# Patient Record
Sex: MALE | Race: WHITE | NOT HISPANIC OR LATINO | Employment: OTHER | URBAN - METROPOLITAN AREA
[De-identification: names, ages, dates, MRNs, and addresses within clinical notes are randomized per-mention and may not be internally consistent; named-entity substitution may affect disease eponyms.]

---

## 2018-01-01 ENCOUNTER — OFFICE VISIT (OUTPATIENT)
Dept: MEDICAL GROUP | Facility: CLINIC | Age: 68
End: 2018-01-01
Payer: MEDICARE

## 2018-01-01 ENCOUNTER — APPOINTMENT (OUTPATIENT)
Dept: RADIOLOGY | Facility: MEDICAL CENTER | Age: 68
DRG: 871 | End: 2018-01-01
Attending: EMERGENCY MEDICINE
Payer: MEDICARE

## 2018-01-01 ENCOUNTER — APPOINTMENT (OUTPATIENT)
Dept: RADIOLOGY | Facility: MEDICAL CENTER | Age: 68
DRG: 871 | End: 2018-01-01
Attending: INTERNAL MEDICINE
Payer: MEDICARE

## 2018-01-01 ENCOUNTER — PATIENT OUTREACH (OUTPATIENT)
Dept: HEALTH INFORMATION MANAGEMENT | Facility: OTHER | Age: 68
End: 2018-01-01

## 2018-01-01 ENCOUNTER — HOSPITAL ENCOUNTER (INPATIENT)
Facility: MEDICAL CENTER | Age: 68
LOS: 1 days | DRG: 871 | End: 2018-06-21
Attending: EMERGENCY MEDICINE | Admitting: FAMILY MEDICINE
Payer: MEDICARE

## 2018-01-01 ENCOUNTER — HOSPITAL ENCOUNTER (INPATIENT)
Facility: MEDICAL CENTER | Age: 68
LOS: 10 days | DRG: 871 | End: 2018-06-13
Attending: EMERGENCY MEDICINE | Admitting: INTERNAL MEDICINE
Payer: MEDICARE

## 2018-01-01 ENCOUNTER — APPOINTMENT (OUTPATIENT)
Dept: RADIOLOGY | Facility: MEDICAL CENTER | Age: 68
DRG: 871 | End: 2018-01-01
Attending: FAMILY MEDICINE
Payer: MEDICARE

## 2018-01-01 VITALS
SYSTOLIC BLOOD PRESSURE: 108 MMHG | WEIGHT: 209.8 LBS | DIASTOLIC BLOOD PRESSURE: 60 MMHG | HEIGHT: 72 IN | HEART RATE: 88 BPM | BODY MASS INDEX: 28.42 KG/M2 | TEMPERATURE: 97.8 F | RESPIRATION RATE: 16 BRPM | OXYGEN SATURATION: 98 %

## 2018-01-01 VITALS
RESPIRATION RATE: 18 BRPM | HEIGHT: 72 IN | TEMPERATURE: 97.8 F | HEART RATE: 74 BPM | WEIGHT: 219.14 LBS | SYSTOLIC BLOOD PRESSURE: 128 MMHG | DIASTOLIC BLOOD PRESSURE: 81 MMHG | BODY MASS INDEX: 29.68 KG/M2 | OXYGEN SATURATION: 91 %

## 2018-01-01 VITALS
TEMPERATURE: 96.8 F | RESPIRATION RATE: 20 BRPM | DIASTOLIC BLOOD PRESSURE: 81 MMHG | OXYGEN SATURATION: 87 % | WEIGHT: 222.66 LBS | SYSTOLIC BLOOD PRESSURE: 130 MMHG | HEART RATE: 153 BPM | HEIGHT: 72 IN | BODY MASS INDEX: 30.16 KG/M2

## 2018-01-01 DIAGNOSIS — Z09 HOSPITAL DISCHARGE FOLLOW-UP: ICD-10-CM

## 2018-01-01 DIAGNOSIS — R17 JAUNDICE: ICD-10-CM

## 2018-01-01 DIAGNOSIS — A41.9 SEPSIS, DUE TO UNSPECIFIED ORGANISM: ICD-10-CM

## 2018-01-01 DIAGNOSIS — C79.9 METASTATIC CANCER (HCC): ICD-10-CM

## 2018-01-01 DIAGNOSIS — G89.3 CANCER ASSOCIATED PAIN: ICD-10-CM

## 2018-01-01 DIAGNOSIS — E80.6 HYPERBILIRUBINEMIA: ICD-10-CM

## 2018-01-01 DIAGNOSIS — N17.9 ACUTE RENAL FAILURE, UNSPECIFIED ACUTE RENAL FAILURE TYPE (HCC): ICD-10-CM

## 2018-01-01 DIAGNOSIS — J96.01 ACUTE RESPIRATORY FAILURE WITH HYPOXIA (HCC): ICD-10-CM

## 2018-01-01 DIAGNOSIS — R74.8 ELEVATED LIVER ENZYMES: ICD-10-CM

## 2018-01-01 DIAGNOSIS — C78.00 MALIGNANT NEOPLASM METASTATIC TO LUNG, UNSPECIFIED LATERALITY (HCC): ICD-10-CM

## 2018-01-01 DIAGNOSIS — R74.01 TRANSAMINITIS: ICD-10-CM

## 2018-01-01 LAB
ACTION RANGE TRIGGERED IACRT: YES
ACTION RANGE TRIGGERED IACRT: YES
ALBUMIN SERPL BCP-MCNC: 2.1 G/DL (ref 3.2–4.9)
ALBUMIN SERPL BCP-MCNC: 2.2 G/DL (ref 3.2–4.9)
ALBUMIN SERPL BCP-MCNC: 2.3 G/DL (ref 3.2–4.9)
ALBUMIN SERPL BCP-MCNC: 2.4 G/DL (ref 3.2–4.9)
ALBUMIN SERPL BCP-MCNC: 2.5 G/DL (ref 3.2–4.9)
ALBUMIN SERPL BCP-MCNC: 2.7 G/DL (ref 3.2–4.9)
ALBUMIN/GLOB SERPL: 0.5 G/DL
ALP SERPL-CCNC: 121 U/L (ref 30–99)
ALP SERPL-CCNC: 130 U/L (ref 30–99)
ALP SERPL-CCNC: 134 U/L (ref 30–99)
ALP SERPL-CCNC: 150 U/L (ref 30–99)
ALP SERPL-CCNC: 183 U/L (ref 30–99)
ALP SERPL-CCNC: 183 U/L (ref 30–99)
ALP SERPL-CCNC: 184 U/L (ref 30–99)
ALP SERPL-CCNC: 196 U/L (ref 30–99)
ALP SERPL-CCNC: 198 U/L (ref 30–99)
ALP SERPL-CCNC: 204 U/L (ref 30–99)
ALP SERPL-CCNC: 228 U/L (ref 30–99)
ALP SERPL-CCNC: 425 U/L (ref 30–99)
ALT SERPL-CCNC: 121 U/L (ref 2–50)
ALT SERPL-CCNC: 30 U/L (ref 2–50)
ALT SERPL-CCNC: 32 U/L (ref 2–50)
ALT SERPL-CCNC: 33 U/L (ref 2–50)
ALT SERPL-CCNC: 34 U/L (ref 2–50)
ALT SERPL-CCNC: 36 U/L (ref 2–50)
ALT SERPL-CCNC: 37 U/L (ref 2–50)
ALT SERPL-CCNC: 38 U/L (ref 2–50)
ALT SERPL-CCNC: 39 U/L (ref 2–50)
ALT SERPL-CCNC: 41 U/L (ref 2–50)
ALT SERPL-CCNC: 43 U/L (ref 2–50)
ALT SERPL-CCNC: 50 U/L (ref 2–50)
ANION GAP SERPL CALC-SCNC: 10 MMOL/L (ref 0–11.9)
ANION GAP SERPL CALC-SCNC: 24 MMOL/L (ref 0–11.9)
ANION GAP SERPL CALC-SCNC: 6 MMOL/L (ref 0–11.9)
ANION GAP SERPL CALC-SCNC: 8 MMOL/L (ref 0–11.9)
ANION GAP SERPL CALC-SCNC: 8 MMOL/L (ref 0–11.9)
ANION GAP SERPL CALC-SCNC: 9 MMOL/L (ref 0–11.9)
ANISOCYTOSIS BLD QL SMEAR: ABNORMAL
APPEARANCE UR: ABNORMAL
APTT PPP: 39.9 SEC (ref 24.7–36)
APTT PPP: 46.9 SEC (ref 24.7–36)
AST SERPL-CCNC: 543 U/L (ref 12–45)
AST SERPL-CCNC: 68 U/L (ref 12–45)
AST SERPL-CCNC: 73 U/L (ref 12–45)
AST SERPL-CCNC: 74 U/L (ref 12–45)
AST SERPL-CCNC: 74 U/L (ref 12–45)
AST SERPL-CCNC: 75 U/L (ref 12–45)
AST SERPL-CCNC: 79 U/L (ref 12–45)
AST SERPL-CCNC: 80 U/L (ref 12–45)
AST SERPL-CCNC: 82 U/L (ref 12–45)
AST SERPL-CCNC: 85 U/L (ref 12–45)
AST SERPL-CCNC: 87 U/L (ref 12–45)
AST SERPL-CCNC: 98 U/L (ref 12–45)
BACTERIA #/AREA URNS HPF: NEGATIVE /HPF
BACTERIA BLD CULT: NORMAL
BACTERIA BLD CULT: NORMAL
BASE EXCESS BLDA CALC-SCNC: -24 MMOL/L (ref -4–3)
BASE EXCESS BLDA CALC-SCNC: -25 MMOL/L (ref -4–3)
BASOPHILS # BLD AUTO: 0 % (ref 0–1.8)
BASOPHILS # BLD AUTO: 0 % (ref 0–1.8)
BASOPHILS # BLD AUTO: 0.2 % (ref 0–1.8)
BASOPHILS # BLD AUTO: 0.2 % (ref 0–1.8)
BASOPHILS # BLD AUTO: 0.3 % (ref 0–1.8)
BASOPHILS # BLD AUTO: 0.3 % (ref 0–1.8)
BASOPHILS # BLD AUTO: 0.4 % (ref 0–1.8)
BASOPHILS # BLD AUTO: 0.5 % (ref 0–1.8)
BASOPHILS # BLD AUTO: 1.8 % (ref 0–1.8)
BASOPHILS # BLD: 0 K/UL (ref 0–0.12)
BASOPHILS # BLD: 0 K/UL (ref 0–0.12)
BASOPHILS # BLD: 0.02 K/UL (ref 0–0.12)
BASOPHILS # BLD: 0.02 K/UL (ref 0–0.12)
BASOPHILS # BLD: 0.03 K/UL (ref 0–0.12)
BASOPHILS # BLD: 0.04 K/UL (ref 0–0.12)
BASOPHILS # BLD: 0.05 K/UL (ref 0–0.12)
BASOPHILS # BLD: 0.05 K/UL (ref 0–0.12)
BASOPHILS # BLD: 0.19 K/UL (ref 0–0.12)
BILIRUB SERPL-MCNC: 10.7 MG/DL (ref 0.1–1.5)
BILIRUB SERPL-MCNC: 11.5 MG/DL (ref 0.1–1.5)
BILIRUB SERPL-MCNC: 12.5 MG/DL (ref 0.1–1.5)
BILIRUB SERPL-MCNC: 12.6 MG/DL (ref 0.1–1.5)
BILIRUB SERPL-MCNC: 13.8 MG/DL (ref 0.1–1.5)
BILIRUB SERPL-MCNC: 15 MG/DL (ref 0.1–1.5)
BILIRUB SERPL-MCNC: 16 MG/DL (ref 0.1–1.5)
BILIRUB SERPL-MCNC: 16 MG/DL (ref 0.1–1.5)
BILIRUB SERPL-MCNC: 16.2 MG/DL (ref 0.1–1.5)
BILIRUB SERPL-MCNC: 17.3 MG/DL (ref 0.1–1.5)
BILIRUB SERPL-MCNC: 17.7 MG/DL (ref 0.1–1.5)
BILIRUB SERPL-MCNC: 17.7 MG/DL (ref 0.1–1.5)
BILIRUB UR QL STRIP.AUTO: ABNORMAL
BNP SERPL-MCNC: 212 PG/ML (ref 0–100)
BODY TEMPERATURE: ABNORMAL DEGREES
BODY TEMPERATURE: ABNORMAL DEGREES
BUN SERPL-MCNC: 11 MG/DL (ref 8–22)
BUN SERPL-MCNC: 12 MG/DL (ref 8–22)
BUN SERPL-MCNC: 12 MG/DL (ref 8–22)
BUN SERPL-MCNC: 13 MG/DL (ref 8–22)
BUN SERPL-MCNC: 14 MG/DL (ref 8–22)
BUN SERPL-MCNC: 15 MG/DL (ref 8–22)
BUN SERPL-MCNC: 17 MG/DL (ref 8–22)
BUN SERPL-MCNC: 33 MG/DL (ref 8–22)
CALCIUM SERPL-MCNC: 7.6 MG/DL (ref 8.5–10.5)
CALCIUM SERPL-MCNC: 7.8 MG/DL (ref 8.5–10.5)
CALCIUM SERPL-MCNC: 8.1 MG/DL (ref 8.5–10.5)
CALCIUM SERPL-MCNC: 8.2 MG/DL (ref 8.5–10.5)
CALCIUM SERPL-MCNC: 8.3 MG/DL (ref 8.5–10.5)
CALCIUM SERPL-MCNC: 8.5 MG/DL (ref 8.5–10.5)
CALCIUM SERPL-MCNC: 8.6 MG/DL (ref 8.5–10.5)
CALCIUM SERPL-MCNC: 8.7 MG/DL (ref 8.5–10.5)
CANCER AG19-9 SERPL-ACNC: ABNORMAL U/ML (ref 0–35)
CEA SERPL-MCNC: 227.2 NG/ML (ref 0–3)
CHLORIDE SERPL-SCNC: 100 MMOL/L (ref 96–112)
CHLORIDE SERPL-SCNC: 94 MMOL/L (ref 96–112)
CHLORIDE SERPL-SCNC: 96 MMOL/L (ref 96–112)
CHLORIDE SERPL-SCNC: 97 MMOL/L (ref 96–112)
CHLORIDE SERPL-SCNC: 98 MMOL/L (ref 96–112)
CHLORIDE SERPL-SCNC: 99 MMOL/L (ref 96–112)
CHLORIDE SERPL-SCNC: 99 MMOL/L (ref 96–112)
CO2 BLDA-SCNC: 12 MMOL/L (ref 20–33)
CO2 BLDA-SCNC: 12 MMOL/L (ref 20–33)
CO2 SERPL-SCNC: 12 MMOL/L (ref 20–33)
CO2 SERPL-SCNC: 21 MMOL/L (ref 20–33)
CO2 SERPL-SCNC: 23 MMOL/L (ref 20–33)
CO2 SERPL-SCNC: 24 MMOL/L (ref 20–33)
CO2 SERPL-SCNC: 25 MMOL/L (ref 20–33)
CO2 SERPL-SCNC: 25 MMOL/L (ref 20–33)
CO2 SERPL-SCNC: 26 MMOL/L (ref 20–33)
CO2 SERPL-SCNC: 26 MMOL/L (ref 20–33)
CO2 SERPL-SCNC: 27 MMOL/L (ref 20–33)
COLOR UR: ABNORMAL
COMMENT 1642: NORMAL
CREAT SERPL-MCNC: 0.52 MG/DL (ref 0.5–1.4)
CREAT SERPL-MCNC: 0.68 MG/DL (ref 0.5–1.4)
CREAT SERPL-MCNC: 0.73 MG/DL (ref 0.5–1.4)
CREAT SERPL-MCNC: 0.73 MG/DL (ref 0.5–1.4)
CREAT SERPL-MCNC: 0.75 MG/DL (ref 0.5–1.4)
CREAT SERPL-MCNC: 0.77 MG/DL (ref 0.5–1.4)
CREAT SERPL-MCNC: 0.78 MG/DL (ref 0.5–1.4)
CREAT SERPL-MCNC: 0.85 MG/DL (ref 0.5–1.4)
CREAT SERPL-MCNC: 0.87 MG/DL (ref 0.5–1.4)
CREAT SERPL-MCNC: 0.87 MG/DL (ref 0.5–1.4)
CREAT SERPL-MCNC: 0.96 MG/DL (ref 0.5–1.4)
CREAT SERPL-MCNC: 3.13 MG/DL (ref 0.5–1.4)
EKG IMPRESSION: NORMAL
EKG IMPRESSION: NORMAL
EOSINOPHIL # BLD AUTO: 0 K/UL (ref 0–0.51)
EOSINOPHIL # BLD AUTO: 0.04 K/UL (ref 0–0.51)
EOSINOPHIL # BLD AUTO: 0.04 K/UL (ref 0–0.51)
EOSINOPHIL # BLD AUTO: 0.05 K/UL (ref 0–0.51)
EOSINOPHIL # BLD AUTO: 0.06 K/UL (ref 0–0.51)
EOSINOPHIL # BLD AUTO: 0.09 K/UL (ref 0–0.51)
EOSINOPHIL # BLD AUTO: 0.09 K/UL (ref 0–0.51)
EOSINOPHIL # BLD AUTO: 0.11 K/UL (ref 0–0.51)
EOSINOPHIL # BLD AUTO: 0.12 K/UL (ref 0–0.51)
EOSINOPHIL # BLD AUTO: 0.16 K/UL (ref 0–0.51)
EOSINOPHIL # BLD AUTO: 0.18 K/UL (ref 0–0.51)
EOSINOPHIL NFR BLD: 0 % (ref 0–6.9)
EOSINOPHIL NFR BLD: 0.4 % (ref 0–6.9)
EOSINOPHIL NFR BLD: 0.6 % (ref 0–6.9)
EOSINOPHIL NFR BLD: 0.7 % (ref 0–6.9)
EOSINOPHIL NFR BLD: 0.8 % (ref 0–6.9)
EOSINOPHIL NFR BLD: 0.9 % (ref 0–6.9)
EOSINOPHIL NFR BLD: 1 % (ref 0–6.9)
EOSINOPHIL NFR BLD: 1.2 % (ref 0–6.9)
EOSINOPHIL NFR BLD: 1.7 % (ref 0–6.9)
EPI CELLS #/AREA URNS HPF: NEGATIVE /HPF
ERYTHROCYTE [DISTWIDTH] IN BLOOD BY AUTOMATED COUNT: 64.6 FL (ref 35.9–50)
ERYTHROCYTE [DISTWIDTH] IN BLOOD BY AUTOMATED COUNT: 68.5 FL (ref 35.9–50)
ERYTHROCYTE [DISTWIDTH] IN BLOOD BY AUTOMATED COUNT: 69.2 FL (ref 35.9–50)
ERYTHROCYTE [DISTWIDTH] IN BLOOD BY AUTOMATED COUNT: 70.9 FL (ref 35.9–50)
ERYTHROCYTE [DISTWIDTH] IN BLOOD BY AUTOMATED COUNT: 72.7 FL (ref 35.9–50)
ERYTHROCYTE [DISTWIDTH] IN BLOOD BY AUTOMATED COUNT: 72.8 FL (ref 35.9–50)
ERYTHROCYTE [DISTWIDTH] IN BLOOD BY AUTOMATED COUNT: 73 FL (ref 35.9–50)
ERYTHROCYTE [DISTWIDTH] IN BLOOD BY AUTOMATED COUNT: 73.2 FL (ref 35.9–50)
ERYTHROCYTE [DISTWIDTH] IN BLOOD BY AUTOMATED COUNT: 74 FL (ref 35.9–50)
ERYTHROCYTE [DISTWIDTH] IN BLOOD BY AUTOMATED COUNT: 74.1 FL (ref 35.9–50)
ERYTHROCYTE [DISTWIDTH] IN BLOOD BY AUTOMATED COUNT: 75.7 FL (ref 35.9–50)
GLOBULIN SER CALC-MCNC: 4.4 G/DL (ref 1.9–3.5)
GLOBULIN SER CALC-MCNC: 4.4 G/DL (ref 1.9–3.5)
GLOBULIN SER CALC-MCNC: 4.6 G/DL (ref 1.9–3.5)
GLOBULIN SER CALC-MCNC: 4.7 G/DL (ref 1.9–3.5)
GLOBULIN SER CALC-MCNC: 4.8 G/DL (ref 1.9–3.5)
GLOBULIN SER CALC-MCNC: 4.9 G/DL (ref 1.9–3.5)
GLOBULIN SER CALC-MCNC: 5 G/DL (ref 1.9–3.5)
GLOBULIN SER CALC-MCNC: 5.1 G/DL (ref 1.9–3.5)
GLOBULIN SER CALC-MCNC: 5.2 G/DL (ref 1.9–3.5)
GLOBULIN SER CALC-MCNC: 5.3 G/DL (ref 1.9–3.5)
GLUCOSE SERPL-MCNC: 103 MG/DL (ref 65–99)
GLUCOSE SERPL-MCNC: 107 MG/DL (ref 65–99)
GLUCOSE SERPL-MCNC: 116 MG/DL (ref 65–99)
GLUCOSE SERPL-MCNC: 122 MG/DL (ref 65–99)
GLUCOSE SERPL-MCNC: 124 MG/DL (ref 65–99)
GLUCOSE SERPL-MCNC: 130 MG/DL (ref 65–99)
GLUCOSE SERPL-MCNC: 156 MG/DL (ref 65–99)
GLUCOSE SERPL-MCNC: 57 MG/DL (ref 65–99)
GLUCOSE SERPL-MCNC: 92 MG/DL (ref 65–99)
GLUCOSE SERPL-MCNC: 94 MG/DL (ref 65–99)
GLUCOSE SERPL-MCNC: 95 MG/DL (ref 65–99)
GLUCOSE SERPL-MCNC: 97 MG/DL (ref 65–99)
GLUCOSE UR STRIP.AUTO-MCNC: NEGATIVE MG/DL
GRAN CASTS #/AREA URNS LPF: ABNORMAL /LPF
HCO3 BLDA-SCNC: 10.5 MMOL/L (ref 17–25)
HCO3 BLDA-SCNC: 9.7 MMOL/L (ref 17–25)
HCT VFR BLD AUTO: 28.1 % (ref 42–52)
HCT VFR BLD AUTO: 30 % (ref 42–52)
HCT VFR BLD AUTO: 30 % (ref 42–52)
HCT VFR BLD AUTO: 30.2 % (ref 42–52)
HCT VFR BLD AUTO: 30.2 % (ref 42–52)
HCT VFR BLD AUTO: 30.6 % (ref 42–52)
HCT VFR BLD AUTO: 31.7 % (ref 42–52)
HCT VFR BLD AUTO: 32.3 % (ref 42–52)
HCT VFR BLD AUTO: 33 % (ref 42–52)
HCT VFR BLD AUTO: 34.3 % (ref 42–52)
HCT VFR BLD AUTO: 36.6 % (ref 42–52)
HGB BLD-MCNC: 10 G/DL (ref 14–18)
HGB BLD-MCNC: 10.1 G/DL (ref 14–18)
HGB BLD-MCNC: 10.2 G/DL (ref 14–18)
HGB BLD-MCNC: 10.3 G/DL (ref 14–18)
HGB BLD-MCNC: 10.6 G/DL (ref 14–18)
HGB BLD-MCNC: 10.8 G/DL (ref 14–18)
HGB BLD-MCNC: 11.3 G/DL (ref 14–18)
HGB BLD-MCNC: 11.5 G/DL (ref 14–18)
HGB BLD-MCNC: 11.6 G/DL (ref 14–18)
HGB BLD-MCNC: 9.4 G/DL (ref 14–18)
HGB BLD-MCNC: 9.9 G/DL (ref 14–18)
HYALINE CASTS #/AREA URNS LPF: ABNORMAL /LPF
IMM GRANULOCYTES # BLD AUTO: 0.06 K/UL (ref 0–0.11)
IMM GRANULOCYTES # BLD AUTO: 0.07 K/UL (ref 0–0.11)
IMM GRANULOCYTES # BLD AUTO: 0.08 K/UL (ref 0–0.11)
IMM GRANULOCYTES # BLD AUTO: 0.1 K/UL (ref 0–0.11)
IMM GRANULOCYTES # BLD AUTO: 0.11 K/UL (ref 0–0.11)
IMM GRANULOCYTES # BLD AUTO: 0.11 K/UL (ref 0–0.11)
IMM GRANULOCYTES # BLD AUTO: 0.13 K/UL (ref 0–0.11)
IMM GRANULOCYTES # BLD AUTO: 0.13 K/UL (ref 0–0.11)
IMM GRANULOCYTES NFR BLD AUTO: 0.5 % (ref 0–0.9)
IMM GRANULOCYTES NFR BLD AUTO: 0.7 % (ref 0–0.9)
IMM GRANULOCYTES NFR BLD AUTO: 0.8 % (ref 0–0.9)
IMM GRANULOCYTES NFR BLD AUTO: 0.8 % (ref 0–0.9)
IMM GRANULOCYTES NFR BLD AUTO: 1 % (ref 0–0.9)
IMM GRANULOCYTES NFR BLD AUTO: 1.3 % (ref 0–0.9)
INR PPP: 1.27 (ref 0.87–1.13)
INR PPP: 1.31 (ref 0.87–1.13)
INR PPP: 1.81 (ref 0.87–1.13)
INST. QUALIFIED PATIENT IIQPT: YES
INST. QUALIFIED PATIENT IIQPT: YES
KETONES UR STRIP.AUTO-MCNC: NEGATIVE MG/DL
LACTATE BLD-SCNC: 16.2 MMOL/L (ref 0.5–2)
LACTATE BLD-SCNC: 2 MMOL/L (ref 0.5–2)
LACTATE BLD-SCNC: 2.5 MMOL/L (ref 0.5–2)
LEUKOCYTE ESTERASE UR QL STRIP.AUTO: ABNORMAL
LIPASE SERPL-CCNC: 35 U/L (ref 11–82)
LYMPHOCYTES # BLD AUTO: 0.69 K/UL (ref 1–4.8)
LYMPHOCYTES # BLD AUTO: 0.72 K/UL (ref 1–4.8)
LYMPHOCYTES # BLD AUTO: 0.73 K/UL (ref 1–4.8)
LYMPHOCYTES # BLD AUTO: 0.93 K/UL (ref 1–4.8)
LYMPHOCYTES # BLD AUTO: 0.95 K/UL (ref 1–4.8)
LYMPHOCYTES # BLD AUTO: 1.03 K/UL (ref 1–4.8)
LYMPHOCYTES # BLD AUTO: 1.12 K/UL (ref 1–4.8)
LYMPHOCYTES # BLD AUTO: 1.14 K/UL (ref 1–4.8)
LYMPHOCYTES # BLD AUTO: 1.15 K/UL (ref 1–4.8)
LYMPHOCYTES # BLD AUTO: 1.31 K/UL (ref 1–4.8)
LYMPHOCYTES # BLD AUTO: 1.6 K/UL (ref 1–4.8)
LYMPHOCYTES NFR BLD: 10.4 % (ref 22–41)
LYMPHOCYTES NFR BLD: 12.2 % (ref 22–41)
LYMPHOCYTES NFR BLD: 12.9 % (ref 22–41)
LYMPHOCYTES NFR BLD: 4.4 % (ref 22–41)
LYMPHOCYTES NFR BLD: 4.4 % (ref 22–41)
LYMPHOCYTES NFR BLD: 7 % (ref 22–41)
LYMPHOCYTES NFR BLD: 8.5 % (ref 22–41)
LYMPHOCYTES NFR BLD: 8.7 % (ref 22–41)
LYMPHOCYTES NFR BLD: 8.8 % (ref 22–41)
LYMPHOCYTES NFR BLD: 9 % (ref 22–41)
LYMPHOCYTES NFR BLD: 9.9 % (ref 22–41)
MACROCYTES BLD QL SMEAR: ABNORMAL
MANUAL DIFF BLD: ABNORMAL
MANUAL DIFF BLD: NORMAL
MANUAL DIFF BLD: NORMAL
MCH RBC QN AUTO: 31.1 PG (ref 27–33)
MCH RBC QN AUTO: 31.6 PG (ref 27–33)
MCH RBC QN AUTO: 31.6 PG (ref 27–33)
MCH RBC QN AUTO: 31.7 PG (ref 27–33)
MCH RBC QN AUTO: 31.9 PG (ref 27–33)
MCH RBC QN AUTO: 32 PG (ref 27–33)
MCH RBC QN AUTO: 32.1 PG (ref 27–33)
MCH RBC QN AUTO: 32.2 PG (ref 27–33)
MCH RBC QN AUTO: 32.7 PG (ref 27–33)
MCH RBC QN AUTO: 32.8 PG (ref 27–33)
MCH RBC QN AUTO: 32.8 PG (ref 27–33)
MCHC RBC AUTO-ENTMCNC: 31.7 G/DL (ref 33.7–35.3)
MCHC RBC AUTO-ENTMCNC: 32.8 G/DL (ref 33.7–35.3)
MCHC RBC AUTO-ENTMCNC: 33.1 G/DL (ref 33.7–35.3)
MCHC RBC AUTO-ENTMCNC: 33.4 G/DL (ref 33.7–35.3)
MCHC RBC AUTO-ENTMCNC: 33.4 G/DL (ref 33.7–35.3)
MCHC RBC AUTO-ENTMCNC: 33.5 G/DL (ref 33.7–35.3)
MCHC RBC AUTO-ENTMCNC: 33.5 G/DL (ref 33.7–35.3)
MCHC RBC AUTO-ENTMCNC: 33.7 G/DL (ref 33.7–35.3)
MCHC RBC AUTO-ENTMCNC: 33.7 G/DL (ref 33.7–35.3)
MCHC RBC AUTO-ENTMCNC: 34 G/DL (ref 33.7–35.3)
MCHC RBC AUTO-ENTMCNC: 34.2 G/DL (ref 33.7–35.3)
MCV RBC AUTO: 103.4 FL (ref 81.4–97.8)
MCV RBC AUTO: 92.7 FL (ref 81.4–97.8)
MCV RBC AUTO: 93.5 FL (ref 81.4–97.8)
MCV RBC AUTO: 93.9 FL (ref 81.4–97.8)
MCV RBC AUTO: 94.6 FL (ref 81.4–97.8)
MCV RBC AUTO: 95.9 FL (ref 81.4–97.8)
MCV RBC AUTO: 96.2 FL (ref 81.4–97.8)
MCV RBC AUTO: 96.4 FL (ref 81.4–97.8)
MCV RBC AUTO: 96.5 FL (ref 81.4–97.8)
MCV RBC AUTO: 96.8 FL (ref 81.4–97.8)
MCV RBC AUTO: 97.4 FL (ref 81.4–97.8)
METAMYELOCYTES NFR BLD MANUAL: 1.7 %
MICRO URNS: ABNORMAL
MONOCYTES # BLD AUTO: 0 K/UL (ref 0–0.85)
MONOCYTES # BLD AUTO: 0.45 K/UL (ref 0–0.85)
MONOCYTES # BLD AUTO: 0.77 K/UL (ref 0–0.85)
MONOCYTES # BLD AUTO: 0.95 K/UL (ref 0–0.85)
MONOCYTES # BLD AUTO: 1.04 K/UL (ref 0–0.85)
MONOCYTES # BLD AUTO: 1.05 K/UL (ref 0–0.85)
MONOCYTES # BLD AUTO: 1.06 K/UL (ref 0–0.85)
MONOCYTES # BLD AUTO: 1.14 K/UL (ref 0–0.85)
MONOCYTES # BLD AUTO: 1.24 K/UL (ref 0–0.85)
MONOCYTES # BLD AUTO: 1.39 K/UL (ref 0–0.85)
MONOCYTES # BLD AUTO: 1.92 K/UL (ref 0–0.85)
MONOCYTES NFR BLD AUTO: 0 % (ref 0–13.4)
MONOCYTES NFR BLD AUTO: 10.1 % (ref 0–13.4)
MONOCYTES NFR BLD AUTO: 10.6 % (ref 0–13.4)
MONOCYTES NFR BLD AUTO: 11.2 % (ref 0–13.4)
MONOCYTES NFR BLD AUTO: 12.3 % (ref 0–13.4)
MONOCYTES NFR BLD AUTO: 4.4 % (ref 0–13.4)
MONOCYTES NFR BLD AUTO: 7.2 % (ref 0–13.4)
MONOCYTES NFR BLD AUTO: 7.2 % (ref 0–13.4)
MONOCYTES NFR BLD AUTO: 9.5 % (ref 0–13.4)
MONOCYTES NFR BLD AUTO: 9.6 % (ref 0–13.4)
MONOCYTES NFR BLD AUTO: 9.8 % (ref 0–13.4)
MORPHOLOGY BLD-IMP: NORMAL
MYELOCYTES NFR BLD MANUAL: 0.9 %
MYELOCYTES NFR BLD MANUAL: 2.6 %
NEUTROPHILS # BLD AUTO: 10.31 K/UL (ref 1.82–7.42)
NEUTROPHILS # BLD AUTO: 10.84 K/UL (ref 1.82–7.42)
NEUTROPHILS # BLD AUTO: 12.73 K/UL (ref 1.82–7.42)
NEUTROPHILS # BLD AUTO: 15.01 K/UL (ref 1.82–7.42)
NEUTROPHILS # BLD AUTO: 7.5 K/UL (ref 1.82–7.42)
NEUTROPHILS # BLD AUTO: 8.15 K/UL (ref 1.82–7.42)
NEUTROPHILS # BLD AUTO: 8.42 K/UL (ref 1.82–7.42)
NEUTROPHILS # BLD AUTO: 8.59 K/UL (ref 1.82–7.42)
NEUTROPHILS # BLD AUTO: 8.67 K/UL (ref 1.82–7.42)
NEUTROPHILS # BLD AUTO: 8.87 K/UL (ref 1.82–7.42)
NEUTROPHILS # BLD AUTO: 9.92 K/UL (ref 1.82–7.42)
NEUTROPHILS NFR BLD: 62.3 % (ref 44–72)
NEUTROPHILS NFR BLD: 73.5 % (ref 44–72)
NEUTROPHILS NFR BLD: 75.9 % (ref 44–72)
NEUTROPHILS NFR BLD: 77.6 % (ref 44–72)
NEUTROPHILS NFR BLD: 78.1 % (ref 44–72)
NEUTROPHILS NFR BLD: 79.2 % (ref 44–72)
NEUTROPHILS NFR BLD: 79.5 % (ref 44–72)
NEUTROPHILS NFR BLD: 81.6 % (ref 44–72)
NEUTROPHILS NFR BLD: 82.6 % (ref 44–72)
NEUTROPHILS NFR BLD: 82.6 % (ref 44–72)
NEUTROPHILS NFR BLD: 84.2 % (ref 44–72)
NEUTS BAND NFR BLD MANUAL: 28.1 % (ref 0–10)
NITRITE UR QL STRIP.AUTO: POSITIVE
NRBC # BLD AUTO: 0 K/UL
NRBC # BLD AUTO: 0.04 K/UL
NRBC BLD-RTO: 0 /100 WBC
NRBC BLD-RTO: 0.2 /100 WBC
O2/TOTAL GAS SETTING VFR VENT: 100 %
O2/TOTAL GAS SETTING VFR VENT: 50 %
PCO2 BLDA: 66.1 MMHG (ref 26–37)
PCO2 BLDA: 68.3 MMHG (ref 26–37)
PH BLDA: 6.76 [PH] (ref 7.4–7.5)
PH BLDA: 6.81 [PH] (ref 7.4–7.5)
PH UR STRIP.AUTO: 5.5 [PH]
PLATELET # BLD AUTO: 183 K/UL (ref 164–446)
PLATELET # BLD AUTO: 331 K/UL (ref 164–446)
PLATELET # BLD AUTO: 331 K/UL (ref 164–446)
PLATELET # BLD AUTO: 336 K/UL (ref 164–446)
PLATELET # BLD AUTO: 343 K/UL (ref 164–446)
PLATELET # BLD AUTO: 345 K/UL (ref 164–446)
PLATELET # BLD AUTO: 348 K/UL (ref 164–446)
PLATELET # BLD AUTO: 350 K/UL (ref 164–446)
PLATELET # BLD AUTO: 360 K/UL (ref 164–446)
PLATELET # BLD AUTO: 363 K/UL (ref 164–446)
PLATELET # BLD AUTO: 368 K/UL (ref 164–446)
PLATELET BLD QL SMEAR: NORMAL
PMV BLD AUTO: 10.4 FL (ref 9–12.9)
PMV BLD AUTO: 9 FL (ref 9–12.9)
PMV BLD AUTO: 9 FL (ref 9–12.9)
PMV BLD AUTO: 9.1 FL (ref 9–12.9)
PMV BLD AUTO: 9.2 FL (ref 9–12.9)
PMV BLD AUTO: 9.2 FL (ref 9–12.9)
PMV BLD AUTO: 9.3 FL (ref 9–12.9)
PMV BLD AUTO: 9.5 FL (ref 9–12.9)
PO2 BLDA: 106 MMHG (ref 64–87)
PO2 BLDA: 61 MMHG (ref 64–87)
POLYCHROMASIA BLD QL SMEAR: NORMAL
POTASSIUM SERPL-SCNC: 3.4 MMOL/L (ref 3.6–5.5)
POTASSIUM SERPL-SCNC: 3.4 MMOL/L (ref 3.6–5.5)
POTASSIUM SERPL-SCNC: 3.5 MMOL/L (ref 3.6–5.5)
POTASSIUM SERPL-SCNC: 3.6 MMOL/L (ref 3.6–5.5)
POTASSIUM SERPL-SCNC: 3.6 MMOL/L (ref 3.6–5.5)
POTASSIUM SERPL-SCNC: 3.7 MMOL/L (ref 3.6–5.5)
POTASSIUM SERPL-SCNC: 3.8 MMOL/L (ref 3.6–5.5)
POTASSIUM SERPL-SCNC: 4 MMOL/L (ref 3.6–5.5)
POTASSIUM SERPL-SCNC: 4.1 MMOL/L (ref 3.6–5.5)
POTASSIUM SERPL-SCNC: 4.2 MMOL/L (ref 3.6–5.5)
PROCALCITONIN SERPL-MCNC: 0.6 NG/ML
PROMYELOCYTES NFR BLD MANUAL: 0.9 %
PROMYELOCYTES NFR BLD MANUAL: 0.9 %
PROT SERPL-MCNC: 6.5 G/DL (ref 6–8.2)
PROT SERPL-MCNC: 6.6 G/DL (ref 6–8.2)
PROT SERPL-MCNC: 7 G/DL (ref 6–8.2)
PROT SERPL-MCNC: 7.1 G/DL (ref 6–8.2)
PROT SERPL-MCNC: 7.2 G/DL (ref 6–8.2)
PROT SERPL-MCNC: 7.3 G/DL (ref 6–8.2)
PROT SERPL-MCNC: 7.4 G/DL (ref 6–8.2)
PROT SERPL-MCNC: 7.5 G/DL (ref 6–8.2)
PROT SERPL-MCNC: 7.6 G/DL (ref 6–8.2)
PROT SERPL-MCNC: 8 G/DL (ref 6–8.2)
PROT UR QL STRIP: 30 MG/DL
PROTHROMBIN TIME: 15.6 SEC (ref 12–14.6)
PROTHROMBIN TIME: 16 SEC (ref 12–14.6)
PROTHROMBIN TIME: 20.7 SEC (ref 12–14.6)
RBC # BLD AUTO: 2.93 M/UL (ref 4.7–6.1)
RBC # BLD AUTO: 3.08 M/UL (ref 4.7–6.1)
RBC # BLD AUTO: 3.12 M/UL (ref 4.7–6.1)
RBC # BLD AUTO: 3.12 M/UL (ref 4.7–6.1)
RBC # BLD AUTO: 3.13 M/UL (ref 4.7–6.1)
RBC # BLD AUTO: 3.26 M/UL (ref 4.7–6.1)
RBC # BLD AUTO: 3.35 M/UL (ref 4.7–6.1)
RBC # BLD AUTO: 3.35 M/UL (ref 4.7–6.1)
RBC # BLD AUTO: 3.53 M/UL (ref 4.7–6.1)
RBC # BLD AUTO: 3.54 M/UL (ref 4.7–6.1)
RBC # BLD AUTO: 3.7 M/UL (ref 4.7–6.1)
RBC # URNS HPF: ABNORMAL /HPF
RBC BLD AUTO: PRESENT
RBC UR QL AUTO: NEGATIVE
SAO2 % BLDA: 61 % (ref 93–99)
SAO2 % BLDA: 89 % (ref 93–99)
SIGNIFICANT IND 70042: NORMAL
SIGNIFICANT IND 70042: NORMAL
SITE SITE: NORMAL
SITE SITE: NORMAL
SODIUM SERPL-SCNC: 127 MMOL/L (ref 135–145)
SODIUM SERPL-SCNC: 128 MMOL/L (ref 135–145)
SODIUM SERPL-SCNC: 128 MMOL/L (ref 135–145)
SODIUM SERPL-SCNC: 129 MMOL/L (ref 135–145)
SODIUM SERPL-SCNC: 131 MMOL/L (ref 135–145)
SODIUM SERPL-SCNC: 132 MMOL/L (ref 135–145)
SODIUM SERPL-SCNC: 133 MMOL/L (ref 135–145)
SOURCE SOURCE: NORMAL
SOURCE SOURCE: NORMAL
SP GR UR STRIP.AUTO: 1.02
SPECIMEN DRAWN FROM PATIENT: ABNORMAL
SPECIMEN DRAWN FROM PATIENT: ABNORMAL
TROPONIN I SERPL-MCNC: 0.01 NG/ML (ref 0–0.04)
TROPONIN I SERPL-MCNC: 0.18 NG/ML (ref 0–0.04)
TSH SERPL DL<=0.005 MIU/L-ACNC: 1 UIU/ML (ref 0.38–5.33)
UROBILINOGEN UR STRIP.AUTO-MCNC: 1 MG/DL
WBC # BLD AUTO: 10.2 K/UL (ref 4.8–10.8)
WBC # BLD AUTO: 10.3 K/UL (ref 4.8–10.8)
WBC # BLD AUTO: 10.4 K/UL (ref 4.8–10.8)
WBC # BLD AUTO: 10.6 K/UL (ref 4.8–10.8)
WBC # BLD AUTO: 10.7 K/UL (ref 4.8–10.8)
WBC # BLD AUTO: 11.1 K/UL (ref 4.8–10.8)
WBC # BLD AUTO: 13 K/UL (ref 4.8–10.8)
WBC # BLD AUTO: 13.1 K/UL (ref 4.8–10.8)
WBC # BLD AUTO: 13.1 K/UL (ref 4.8–10.8)
WBC # BLD AUTO: 15.6 K/UL (ref 4.8–10.8)
WBC # BLD AUTO: 16.6 K/UL (ref 4.8–10.8)
WBC #/AREA URNS HPF: ABNORMAL /HPF

## 2018-01-01 PROCEDURE — A9270 NON-COVERED ITEM OR SERVICE: HCPCS | Performed by: INTERNAL MEDICINE

## 2018-01-01 PROCEDURE — 96366 THER/PROPH/DIAG IV INF ADDON: CPT

## 2018-01-01 PROCEDURE — 85007 BL SMEAR W/DIFF WBC COUNT: CPT

## 2018-01-01 PROCEDURE — 700111 HCHG RX REV CODE 636 W/ 250 OVERRIDE (IP)

## 2018-01-01 PROCEDURE — 80053 COMPREHEN METABOLIC PANEL: CPT

## 2018-01-01 PROCEDURE — 303105 HCHG CATHETER EXTRA

## 2018-01-01 PROCEDURE — 160048 HCHG OR STATISTICAL LEVEL 1-5: Performed by: INTERNAL MEDICINE

## 2018-01-01 PROCEDURE — 76705 ECHO EXAM OF ABDOMEN: CPT

## 2018-01-01 PROCEDURE — 160002 HCHG RECOVERY MINUTES (STAT)

## 2018-01-01 PROCEDURE — 700101 HCHG RX REV CODE 250

## 2018-01-01 PROCEDURE — 99233 SBSQ HOSP IP/OBS HIGH 50: CPT | Performed by: INTERNAL MEDICINE

## 2018-01-01 PROCEDURE — 700111 HCHG RX REV CODE 636 W/ 250 OVERRIDE (IP): Performed by: INTERNAL MEDICINE

## 2018-01-01 PROCEDURE — 83605 ASSAY OF LACTIC ACID: CPT

## 2018-01-01 PROCEDURE — 99291 CRITICAL CARE FIRST HOUR: CPT

## 2018-01-01 PROCEDURE — 36415 COLL VENOUS BLD VENIPUNCTURE: CPT

## 2018-01-01 PROCEDURE — 94640 AIRWAY INHALATION TREATMENT: CPT

## 2018-01-01 PROCEDURE — 306637 HCHG MISC ORTHO ITEM RC 0274

## 2018-01-01 PROCEDURE — 700117 HCHG RX CONTRAST REV CODE 255: Performed by: RADIOLOGY

## 2018-01-01 PROCEDURE — 85730 THROMBOPLASTIN TIME PARTIAL: CPT

## 2018-01-01 PROCEDURE — 700105 HCHG RX REV CODE 258: Performed by: EMERGENCY MEDICINE

## 2018-01-01 PROCEDURE — 82378 CARCINOEMBRYONIC ANTIGEN: CPT

## 2018-01-01 PROCEDURE — 302214 INTUBATION BOX: Performed by: EMERGENCY MEDICINE

## 2018-01-01 PROCEDURE — 87077 CULTURE AEROBIC IDENTIFY: CPT

## 2018-01-01 PROCEDURE — 85025 COMPLETE CBC W/AUTO DIFF WBC: CPT

## 2018-01-01 PROCEDURE — 700102 HCHG RX REV CODE 250 W/ 637 OVERRIDE(OP): Performed by: INTERNAL MEDICINE

## 2018-01-01 PROCEDURE — 96365 THER/PROPH/DIAG IV INF INIT: CPT

## 2018-01-01 PROCEDURE — BF101ZZ FLUOROSCOPY OF BILE DUCTS USING LOW OSMOLAR CONTRAST: ICD-10-PCS | Performed by: RADIOLOGY

## 2018-01-01 PROCEDURE — 87040 BLOOD CULTURE FOR BACTERIA: CPT

## 2018-01-01 PROCEDURE — 700101 HCHG RX REV CODE 250: Performed by: EMERGENCY MEDICINE

## 2018-01-01 PROCEDURE — 71045 X-RAY EXAM CHEST 1 VIEW: CPT

## 2018-01-01 PROCEDURE — 85610 PROTHROMBIN TIME: CPT

## 2018-01-01 PROCEDURE — 770004 HCHG ROOM/CARE - ONCOLOGY PRIVATE *

## 2018-01-01 PROCEDURE — 71260 CT THORAX DX C+: CPT

## 2018-01-01 PROCEDURE — 99291 CRITICAL CARE FIRST HOUR: CPT | Mod: 25 | Performed by: INTERNAL MEDICINE

## 2018-01-01 PROCEDURE — 0F9930Z DRAINAGE OF COMMON BILE DUCT WITH DRAINAGE DEVICE, PERCUTANEOUS APPROACH: ICD-10-PCS | Performed by: RADIOLOGY

## 2018-01-01 PROCEDURE — 700117 HCHG RX CONTRAST REV CODE 255: Performed by: EMERGENCY MEDICINE

## 2018-01-01 PROCEDURE — 700105 HCHG RX REV CODE 258: Performed by: INTERNAL MEDICINE

## 2018-01-01 PROCEDURE — 160203 HCHG ENDO MINUTES - 1ST 30 MINS LEVEL 4: Performed by: INTERNAL MEDICINE

## 2018-01-01 PROCEDURE — 47533 PLMT BILIARY DRAINAGE CATH: CPT

## 2018-01-01 PROCEDURE — 99239 HOSP IP/OBS DSCHRG MGMT >30: CPT | Performed by: INTERNAL MEDICINE

## 2018-01-01 PROCEDURE — 0DBN8ZZ EXCISION OF SIGMOID COLON, VIA NATURAL OR ARTIFICIAL OPENING ENDOSCOPIC: ICD-10-PCS | Performed by: INTERNAL MEDICINE

## 2018-01-01 PROCEDURE — 36600 WITHDRAWAL OF ARTERIAL BLOOD: CPT

## 2018-01-01 PROCEDURE — 84145 PROCALCITONIN (PCT): CPT

## 2018-01-01 PROCEDURE — 302131 K PAD MOTOR: Performed by: INTERNAL MEDICINE

## 2018-01-01 PROCEDURE — 700111 HCHG RX REV CODE 636 W/ 250 OVERRIDE (IP): Performed by: EMERGENCY MEDICINE

## 2018-01-01 PROCEDURE — 99232 SBSQ HOSP IP/OBS MODERATE 35: CPT | Performed by: INTERNAL MEDICINE

## 2018-01-01 PROCEDURE — 92950 HEART/LUNG RESUSCITATION CPR: CPT | Performed by: INTERNAL MEDICINE

## 2018-01-01 PROCEDURE — 85027 COMPLETE CBC AUTOMATED: CPT

## 2018-01-01 PROCEDURE — 94002 VENT MGMT INPAT INIT DAY: CPT

## 2018-01-01 PROCEDURE — 160208 HCHG ENDO MINUTES - EA ADDL 1 MIN LEVEL 4: Performed by: INTERNAL MEDICINE

## 2018-01-01 PROCEDURE — 87040 BLOOD CULTURE FOR BACTERIA: CPT | Mod: 91

## 2018-01-01 PROCEDURE — 83690 ASSAY OF LIPASE: CPT

## 2018-01-01 PROCEDURE — 83880 ASSAY OF NATRIURETIC PEPTIDE: CPT

## 2018-01-01 PROCEDURE — 84484 ASSAY OF TROPONIN QUANT: CPT

## 2018-01-01 PROCEDURE — 160002 HCHG RECOVERY MINUTES (STAT): Performed by: INTERNAL MEDICINE

## 2018-01-01 PROCEDURE — 82803 BLOOD GASES ANY COMBINATION: CPT

## 2018-01-01 PROCEDURE — 84443 ASSAY THYROID STIM HORMONE: CPT

## 2018-01-01 PROCEDURE — 99153 MOD SED SAME PHYS/QHP EA: CPT

## 2018-01-01 PROCEDURE — 304538 HCHG NG TUBE

## 2018-01-01 PROCEDURE — 96375 TX/PRO/DX INJ NEW DRUG ADDON: CPT

## 2018-01-01 PROCEDURE — 0FB13ZX EXCISION OF RIGHT LOBE LIVER, PERCUTANEOUS APPROACH, DIAGNOSTIC: ICD-10-PCS | Performed by: RADIOLOGY

## 2018-01-01 PROCEDURE — 99153 MOD SED SAME PHYS/QHP EA: CPT | Performed by: INTERNAL MEDICINE

## 2018-01-01 PROCEDURE — 700101 HCHG RX REV CODE 250: Performed by: INTERNAL MEDICINE

## 2018-01-01 PROCEDURE — 94760 N-INVAS EAR/PLS OXIMETRY 1: CPT

## 2018-01-01 PROCEDURE — 99292 CRITICAL CARE ADDL 30 MIN: CPT

## 2018-01-01 PROCEDURE — 51702 INSERT TEMP BLADDER CATH: CPT

## 2018-01-01 PROCEDURE — 99285 EMERGENCY DEPT VISIT HI MDM: CPT

## 2018-01-01 PROCEDURE — 96367 TX/PROPH/DG ADDL SEQ IV INF: CPT

## 2018-01-01 PROCEDURE — 88342 IMHCHEM/IMCYTCHM 1ST ANTB: CPT

## 2018-01-01 PROCEDURE — 93005 ELECTROCARDIOGRAM TRACING: CPT | Performed by: EMERGENCY MEDICINE

## 2018-01-01 PROCEDURE — 5A1935Z RESPIRATORY VENTILATION, LESS THAN 24 CONSECUTIVE HOURS: ICD-10-PCS | Performed by: EMERGENCY MEDICINE

## 2018-01-01 PROCEDURE — 87186 SC STD MICRODIL/AGAR DIL: CPT

## 2018-01-01 PROCEDURE — 86301 IMMUNOASSAY TUMOR CA 19-9: CPT

## 2018-01-01 PROCEDURE — 302151 K-PAD 14X20: Performed by: INTERNAL MEDICINE

## 2018-01-01 PROCEDURE — 700111 HCHG RX REV CODE 636 W/ 250 OVERRIDE (IP): Performed by: RADIOLOGY

## 2018-01-01 PROCEDURE — 74176 CT ABD & PELVIS W/O CONTRAST: CPT

## 2018-01-01 PROCEDURE — 503081 HCHG INK, SPOT LF (ENDO): Performed by: INTERNAL MEDICINE

## 2018-01-01 PROCEDURE — 74150 CT ABDOMEN W/O CONTRAST: CPT

## 2018-01-01 PROCEDURE — 500066 HCHG BITE BLOCK, ECT: Performed by: INTERNAL MEDICINE

## 2018-01-01 PROCEDURE — 82962 GLUCOSE BLOOD TEST: CPT

## 2018-01-01 PROCEDURE — 96368 THER/DIAG CONCURRENT INF: CPT

## 2018-01-01 PROCEDURE — 88307 TISSUE EXAM BY PATHOLOGIST: CPT

## 2018-01-01 PROCEDURE — 0DJ08ZZ INSPECTION OF UPPER INTESTINAL TRACT, VIA NATURAL OR ARTIFICIAL OPENING ENDOSCOPIC: ICD-10-PCS | Performed by: INTERNAL MEDICINE

## 2018-01-01 PROCEDURE — 99291 CRITICAL CARE FIRST HOUR: CPT | Performed by: FAMILY MEDICINE

## 2018-01-01 PROCEDURE — 92950 HEART/LUNG RESUSCITATION CPR: CPT

## 2018-01-01 PROCEDURE — 5A12012 PERFORMANCE OF CARDIAC OUTPUT, SINGLE, MANUAL: ICD-10-PCS | Performed by: EMERGENCY MEDICINE

## 2018-01-01 PROCEDURE — 160035 HCHG PACU - 1ST 60 MINS PHASE I: Performed by: INTERNAL MEDICINE

## 2018-01-01 PROCEDURE — 99223 1ST HOSP IP/OBS HIGH 75: CPT | Performed by: INTERNAL MEDICINE

## 2018-01-01 PROCEDURE — 99495 TRANSJ CARE MGMT MOD F2F 14D: CPT | Performed by: FAMILY MEDICINE

## 2018-01-01 PROCEDURE — 81001 URINALYSIS AUTO W/SCOPE: CPT

## 2018-01-01 PROCEDURE — 99152 MOD SED SAME PHYS/QHP 5/>YRS: CPT | Performed by: INTERNAL MEDICINE

## 2018-01-01 PROCEDURE — 31500 INSERT EMERGENCY AIRWAY: CPT

## 2018-01-01 PROCEDURE — 88341 IMHCHEM/IMCYTCHM EA ADD ANTB: CPT | Mod: 91

## 2018-01-01 PROCEDURE — 88305 TISSUE EXAM BY PATHOLOGIST: CPT

## 2018-01-01 PROCEDURE — 0BH17EZ INSERTION OF ENDOTRACHEAL AIRWAY INTO TRACHEA, VIA NATURAL OR ARTIFICIAL OPENING: ICD-10-PCS | Performed by: EMERGENCY MEDICINE

## 2018-01-01 RX ORDER — DEXTROSE MONOHYDRATE 25 G/50ML
INJECTION, SOLUTION INTRAVENOUS
Status: COMPLETED | OUTPATIENT
Start: 2018-01-01 | End: 2018-01-01

## 2018-01-01 RX ORDER — SODIUM CHLORIDE 9 MG/ML
INJECTION, SOLUTION INTRAVENOUS
Status: COMPLETED | OUTPATIENT
Start: 2018-01-01 | End: 2018-01-01

## 2018-01-01 RX ORDER — DIPHENHYDRAMINE HCL 25 MG
25 TABLET ORAL EVERY 6 HOURS PRN
Status: DISCONTINUED | OUTPATIENT
Start: 2018-01-01 | End: 2018-01-01 | Stop reason: HOSPADM

## 2018-01-01 RX ORDER — SODIUM CHLORIDE 9 MG/ML
1000 INJECTION, SOLUTION INTRAVENOUS
Status: DISCONTINUED | OUTPATIENT
Start: 2018-01-01 | End: 2018-01-01 | Stop reason: HOSPADM

## 2018-01-01 RX ORDER — OXYCODONE HYDROCHLORIDE 5 MG/1
5 TABLET ORAL
Status: DISCONTINUED | OUTPATIENT
Start: 2018-01-01 | End: 2018-01-01 | Stop reason: HOSPADM

## 2018-01-01 RX ORDER — MIDAZOLAM HYDROCHLORIDE 1 MG/ML
INJECTION INTRAMUSCULAR; INTRAVENOUS
Status: COMPLETED
Start: 2018-01-01 | End: 2018-01-01

## 2018-01-01 RX ORDER — BISACODYL 10 MG
10 SUPPOSITORY, RECTAL RECTAL
Status: DISCONTINUED | OUTPATIENT
Start: 2018-01-01 | End: 2018-06-22 | Stop reason: HOSPADM

## 2018-01-01 RX ORDER — SODIUM CHLORIDE 9 MG/ML
500 INJECTION, SOLUTION INTRAVENOUS
Status: ACTIVE | OUTPATIENT
Start: 2018-01-01 | End: 2018-01-01

## 2018-01-01 RX ORDER — HYDROCODONE BITARTRATE AND ACETAMINOPHEN 5; 325 MG/1; MG/1
1-2 TABLET ORAL EVERY 6 HOURS PRN
Qty: 20 TAB | Refills: 0 | Status: SHIPPED | OUTPATIENT
Start: 2018-01-01 | End: 2018-01-01

## 2018-01-01 RX ORDER — NOREPINEPHRINE BITARTRATE 1 MG/ML
INJECTION, SOLUTION INTRAVENOUS
Status: DISCONTINUED
Start: 2018-01-01 | End: 2018-06-22 | Stop reason: HOSPADM

## 2018-01-01 RX ORDER — SODIUM CHLORIDE 9 MG/ML
500 INJECTION, SOLUTION INTRAVENOUS PRN
Status: DISCONTINUED | OUTPATIENT
Start: 2018-01-01 | End: 2018-01-01 | Stop reason: HOSPADM

## 2018-01-01 RX ORDER — DIPHENHYDRAMINE HCL 25 MG
25 TABLET ORAL EVERY 6 HOURS PRN
Qty: 30 TAB | Refills: 0 | Status: SHIPPED | OUTPATIENT
Start: 2018-01-01 | End: 2018-01-01

## 2018-01-01 RX ORDER — LIDOCAINE HYDROCHLORIDE 20 MG/ML
INJECTION, SOLUTION INFILTRATION; PERINEURAL
Status: COMPLETED
Start: 2018-01-01 | End: 2018-01-01

## 2018-01-01 RX ORDER — DEXTROSE MONOHYDRATE, SODIUM CHLORIDE, AND POTASSIUM CHLORIDE 50; 1.49; 9 G/1000ML; G/1000ML; G/1000ML
INJECTION, SOLUTION INTRAVENOUS CONTINUOUS
Status: DISCONTINUED | OUTPATIENT
Start: 2018-01-01 | End: 2018-06-22

## 2018-01-01 RX ORDER — MIDAZOLAM HYDROCHLORIDE 1 MG/ML
.5-2 INJECTION INTRAMUSCULAR; INTRAVENOUS PRN
Status: ACTIVE | OUTPATIENT
Start: 2018-01-01 | End: 2018-01-01

## 2018-01-01 RX ORDER — IPRATROPIUM BROMIDE AND ALBUTEROL SULFATE 2.5; .5 MG/3ML; MG/3ML
3 SOLUTION RESPIRATORY (INHALATION)
Status: COMPLETED | OUTPATIENT
Start: 2018-01-01 | End: 2018-01-01

## 2018-01-01 RX ORDER — DEXTROSE MONOHYDRATE 25 G/50ML
25 INJECTION, SOLUTION INTRAVENOUS
Status: DISCONTINUED | OUTPATIENT
Start: 2018-01-01 | End: 2018-06-22 | Stop reason: HOSPADM

## 2018-01-01 RX ORDER — ONDANSETRON 4 MG/1
4 TABLET, ORALLY DISINTEGRATING ORAL EVERY 4 HOURS PRN
Qty: 10 TAB | Refills: 0 | Status: SHIPPED | OUTPATIENT
Start: 2018-01-01 | End: 2018-01-01

## 2018-01-01 RX ORDER — ONDANSETRON 2 MG/ML
4 INJECTION INTRAMUSCULAR; INTRAVENOUS EVERY 4 HOURS PRN
Status: DISCONTINUED | OUTPATIENT
Start: 2018-01-01 | End: 2018-06-22

## 2018-01-01 RX ORDER — OXYCODONE HYDROCHLORIDE 5 MG/1
2.5 TABLET ORAL
Status: DISCONTINUED | OUTPATIENT
Start: 2018-01-01 | End: 2018-01-01 | Stop reason: HOSPADM

## 2018-01-01 RX ORDER — AMOXICILLIN 250 MG
2 CAPSULE ORAL 2 TIMES DAILY PRN
Status: DISCONTINUED | OUTPATIENT
Start: 2018-01-01 | End: 2018-01-01 | Stop reason: HOSPADM

## 2018-01-01 RX ORDER — SODIUM CHLORIDE 9 MG/ML
1000 INJECTION, SOLUTION INTRAVENOUS
Status: DISCONTINUED | OUTPATIENT
Start: 2018-01-01 | End: 2018-06-22

## 2018-01-01 RX ORDER — CYCLOBENZAPRINE HCL 10 MG
10 TABLET ORAL 3 TIMES DAILY PRN
Status: DISCONTINUED | OUTPATIENT
Start: 2018-01-01 | End: 2018-01-01 | Stop reason: HOSPADM

## 2018-01-01 RX ORDER — SODIUM CHLORIDE, SODIUM LACTATE, POTASSIUM CHLORIDE, CALCIUM CHLORIDE 600; 310; 30; 20 MG/100ML; MG/100ML; MG/100ML; MG/100ML
INJECTION, SOLUTION INTRAVENOUS
Status: COMPLETED | OUTPATIENT
Start: 2018-01-01 | End: 2018-01-01

## 2018-01-01 RX ORDER — SODIUM CHLORIDE 9 MG/ML
30 INJECTION, SOLUTION INTRAVENOUS
Status: DISCONTINUED | OUTPATIENT
Start: 2018-01-01 | End: 2018-01-01

## 2018-01-01 RX ORDER — DIPHENHYDRAMINE HCL 25 MG
25 TABLET ORAL EVERY 6 HOURS PRN
Qty: 30 TAB | Refills: 0 | Status: SHIPPED | OUTPATIENT
Start: 2018-01-01

## 2018-01-01 RX ORDER — BISACODYL 10 MG
10 SUPPOSITORY, RECTAL RECTAL
Status: DISCONTINUED | OUTPATIENT
Start: 2018-01-01 | End: 2018-01-01 | Stop reason: HOSPADM

## 2018-01-01 RX ORDER — MIDAZOLAM HYDROCHLORIDE 1 MG/ML
INJECTION INTRAMUSCULAR; INTRAVENOUS
Status: DISPENSED
Start: 2018-01-01 | End: 2018-01-01

## 2018-01-01 RX ORDER — ONDANSETRON 4 MG/1
4 TABLET, ORALLY DISINTEGRATING ORAL EVERY 4 HOURS PRN
Status: DISCONTINUED | OUTPATIENT
Start: 2018-01-01 | End: 2018-06-22

## 2018-01-01 RX ORDER — IBUPROFEN 200 MG
400 TABLET ORAL EVERY 6 HOURS PRN
COMMUNITY

## 2018-01-01 RX ORDER — SODIUM CHLORIDE 9 MG/ML
30 INJECTION, SOLUTION INTRAVENOUS
Status: DISCONTINUED | OUTPATIENT
Start: 2018-01-01 | End: 2018-01-01 | Stop reason: HOSPADM

## 2018-01-01 RX ORDER — CEFTRIAXONE 2 G/1
2 INJECTION, POWDER, FOR SOLUTION INTRAMUSCULAR; INTRAVENOUS ONCE
Status: COMPLETED | OUTPATIENT
Start: 2018-01-01 | End: 2018-01-01

## 2018-01-01 RX ORDER — ETOMIDATE 2 MG/ML
INJECTION INTRAVENOUS
Status: COMPLETED | OUTPATIENT
Start: 2018-01-01 | End: 2018-01-01

## 2018-01-01 RX ORDER — SUCCINYLCHOLINE CHLORIDE 20 MG/ML
INJECTION INTRAMUSCULAR; INTRAVENOUS
Status: COMPLETED | OUTPATIENT
Start: 2018-01-01 | End: 2018-01-01

## 2018-01-01 RX ORDER — ONDANSETRON 2 MG/ML
4 INJECTION INTRAMUSCULAR; INTRAVENOUS EVERY 4 HOURS PRN
Status: DISCONTINUED | OUTPATIENT
Start: 2018-01-01 | End: 2018-01-01 | Stop reason: HOSPADM

## 2018-01-01 RX ORDER — ACETAMINOPHEN 325 MG/1
650 TABLET ORAL EVERY 6 HOURS PRN
Status: DISCONTINUED | OUTPATIENT
Start: 2018-01-01 | End: 2018-01-01 | Stop reason: HOSPADM

## 2018-01-01 RX ORDER — SODIUM CHLORIDE 9 MG/ML
INJECTION, SOLUTION INTRAVENOUS CONTINUOUS
Status: DISCONTINUED | OUTPATIENT
Start: 2018-01-01 | End: 2018-01-01

## 2018-01-01 RX ORDER — DOPAMINE HYDROCHLORIDE 160 MG/100ML
INJECTION, SOLUTION INTRAVENOUS
Status: DISCONTINUED
Start: 2018-01-01 | End: 2018-06-22 | Stop reason: HOSPADM

## 2018-01-01 RX ORDER — AMOXICILLIN 250 MG
2 CAPSULE ORAL 2 TIMES DAILY
Status: DISCONTINUED | OUTPATIENT
Start: 2018-01-01 | End: 2018-06-22

## 2018-01-01 RX ORDER — POLYETHYLENE GLYCOL 3350 17 G/17G
1 POWDER, FOR SOLUTION ORAL
Status: DISCONTINUED | OUTPATIENT
Start: 2018-01-01 | End: 2018-06-22

## 2018-01-01 RX ORDER — ONDANSETRON 4 MG/1
4 TABLET, ORALLY DISINTEGRATING ORAL EVERY 4 HOURS PRN
Status: DISCONTINUED | OUTPATIENT
Start: 2018-01-01 | End: 2018-01-01 | Stop reason: HOSPADM

## 2018-01-01 RX ORDER — OMEPRAZOLE 20 MG/1
20 CAPSULE, DELAYED RELEASE ORAL 2 TIMES DAILY
Qty: 60 CAP | Refills: 0 | Status: SHIPPED | OUTPATIENT
Start: 2018-01-01 | End: 2018-01-01

## 2018-01-01 RX ORDER — CEFTRIAXONE 1 G/1
INJECTION, POWDER, FOR SOLUTION INTRAMUSCULAR; INTRAVENOUS
Status: COMPLETED
Start: 2018-01-01 | End: 2018-01-01

## 2018-01-01 RX ORDER — MORPHINE SULFATE 4 MG/ML
2 INJECTION, SOLUTION INTRAMUSCULAR; INTRAVENOUS
Status: DISCONTINUED | OUTPATIENT
Start: 2018-01-01 | End: 2018-01-01 | Stop reason: HOSPADM

## 2018-01-01 RX ORDER — LIDOCAINE HYDROCHLORIDE 40 MG/ML
SOLUTION TOPICAL
Status: DISPENSED
Start: 2018-01-01 | End: 2018-01-01

## 2018-01-01 RX ORDER — MIDAZOLAM HYDROCHLORIDE 1 MG/ML
INJECTION INTRAMUSCULAR; INTRAVENOUS
Status: DISCONTINUED | OUTPATIENT
Start: 2018-01-01 | End: 2018-01-01 | Stop reason: HOSPADM

## 2018-01-01 RX ORDER — POLYETHYLENE GLYCOL 3350 17 G/17G
1 POWDER, FOR SOLUTION ORAL
Status: DISCONTINUED | OUTPATIENT
Start: 2018-01-01 | End: 2018-01-01 | Stop reason: HOSPADM

## 2018-01-01 RX ORDER — ONDANSETRON 2 MG/ML
4 INJECTION INTRAMUSCULAR; INTRAVENOUS PRN
Status: ACTIVE | OUTPATIENT
Start: 2018-01-01 | End: 2018-01-01

## 2018-01-01 RX ORDER — AMOXICILLIN 250 MG
2 CAPSULE ORAL 2 TIMES DAILY
Status: DISCONTINUED | OUTPATIENT
Start: 2018-01-01 | End: 2018-06-22 | Stop reason: HOSPADM

## 2018-01-01 RX ORDER — FAMOTIDINE 20 MG/1
20 TABLET, FILM COATED ORAL DAILY
Status: DISCONTINUED | OUTPATIENT
Start: 2018-06-22 | End: 2018-06-22 | Stop reason: HOSPADM

## 2018-01-01 RX ORDER — HEPARIN SODIUM 5000 [USP'U]/ML
5000 INJECTION, SOLUTION INTRAVENOUS; SUBCUTANEOUS EVERY 8 HOURS
Status: DISCONTINUED | OUTPATIENT
Start: 2018-01-01 | End: 2018-06-22 | Stop reason: HOSPADM

## 2018-01-01 RX ORDER — HEPARIN SODIUM 5000 [USP'U]/ML
5000 INJECTION, SOLUTION INTRAVENOUS; SUBCUTANEOUS EVERY 8 HOURS
Status: DISCONTINUED | OUTPATIENT
Start: 2018-01-01 | End: 2018-06-22

## 2018-01-01 RX ORDER — BISACODYL 10 MG
10 SUPPOSITORY, RECTAL RECTAL
Status: DISCONTINUED | OUTPATIENT
Start: 2018-01-01 | End: 2018-06-22

## 2018-01-01 RX ORDER — LORAZEPAM 2 MG/ML
1 INJECTION INTRAMUSCULAR ONCE
Status: COMPLETED | OUTPATIENT
Start: 2018-01-01 | End: 2018-01-01

## 2018-01-01 RX ORDER — VECURONIUM BROMIDE 1 MG/ML
INJECTION, POWDER, LYOPHILIZED, FOR SOLUTION INTRAVENOUS
Status: COMPLETED | OUTPATIENT
Start: 2018-01-01 | End: 2018-01-01

## 2018-01-01 RX ORDER — POLYETHYLENE GLYCOL 3350 17 G/17G
1 POWDER, FOR SOLUTION ORAL
Status: DISCONTINUED | OUTPATIENT
Start: 2018-01-01 | End: 2018-06-22 | Stop reason: HOSPADM

## 2018-01-01 RX ADMIN — OXYCODONE HYDROCHLORIDE 5 MG: 5 TABLET ORAL at 19:41

## 2018-01-01 RX ADMIN — OXYCODONE HYDROCHLORIDE 5 MG: 5 TABLET ORAL at 19:30

## 2018-01-01 RX ADMIN — STANDARDIZED SENNA CONCENTRATE AND DOCUSATE SODIUM 2 TABLET: 8.6; 5 TABLET, FILM COATED ORAL at 09:00

## 2018-01-01 RX ADMIN — PIPERACILLIN SODIUM AND TAZOBACTAM SODIUM 3.38 G: 3; .375 INJECTION, POWDER, FOR SOLUTION INTRAVENOUS at 16:01

## 2018-01-01 RX ADMIN — POLYETHYLENE GLYCOL 3350, SODIUM SULFATE ANHYDROUS, SODIUM BICARBONATE, SODIUM CHLORIDE, POTASSIUM CHLORIDE 4 L: 236; 22.74; 6.74; 5.86; 2.97 POWDER, FOR SOLUTION ORAL at 12:06

## 2018-01-01 RX ADMIN — FENTANYL CITRATE 25 MCG: 50 INJECTION, SOLUTION INTRAMUSCULAR; INTRAVENOUS at 09:30

## 2018-01-01 RX ADMIN — EPINEPHRINE 1 MG: 0.1 INJECTION, SOLUTION ENDOTRACHEAL; INTRACARDIAC; INTRAVENOUS at 23:03

## 2018-01-01 RX ADMIN — SUCCINYLCHOLINE CHLORIDE 120 MG: 20 INJECTION, SOLUTION INTRAMUSCULAR; INTRAVENOUS at 20:34

## 2018-01-01 RX ADMIN — EPINEPHRINE 1 MG: 0.1 INJECTION, SOLUTION ENDOTRACHEAL; INTRACARDIAC; INTRAVENOUS at 23:24

## 2018-01-01 RX ADMIN — PIPERACILLIN SODIUM AND TAZOBACTAM SODIUM 3.38 G: 3; .375 INJECTION, POWDER, FOR SOLUTION INTRAVENOUS at 20:59

## 2018-01-01 RX ADMIN — PIPERACILLIN SODIUM AND TAZOBACTAM SODIUM 3.38 G: 3; .375 INJECTION, POWDER, FOR SOLUTION INTRAVENOUS at 13:07

## 2018-01-01 RX ADMIN — ETOMIDATE 20 MG: 2 INJECTION INTRAVENOUS at 20:33

## 2018-01-01 RX ADMIN — DEXTROSE MONOHYDRATE 50 ML: 25 INJECTION, SOLUTION INTRAVENOUS at 23:08

## 2018-01-01 RX ADMIN — SODIUM CHLORIDE: 9 INJECTION, SOLUTION INTRAVENOUS at 03:57

## 2018-01-01 RX ADMIN — PIPERACILLIN SODIUM AND TAZOBACTAM SODIUM 3.38 G: 3; .375 INJECTION, POWDER, FOR SOLUTION INTRAVENOUS at 05:29

## 2018-01-01 RX ADMIN — FENTANYL CITRATE 50 MCG: 50 INJECTION, SOLUTION INTRAMUSCULAR; INTRAVENOUS at 14:20

## 2018-01-01 RX ADMIN — EPINEPHRINE 1 MG: 0.1 INJECTION, SOLUTION ENDOTRACHEAL; INTRACARDIAC; INTRAVENOUS at 23:41

## 2018-01-01 RX ADMIN — PROPOFOL 5 MCG/KG/MIN: 10 INJECTION, EMULSION INTRAVENOUS at 20:50

## 2018-01-01 RX ADMIN — SODIUM CHLORIDE 1000 ML: 9 INJECTION, SOLUTION INTRAVENOUS at 22:53

## 2018-01-01 RX ADMIN — PIPERACILLIN SODIUM AND TAZOBACTAM SODIUM 3.38 G: 3; .375 INJECTION, POWDER, FOR SOLUTION INTRAVENOUS at 21:14

## 2018-01-01 RX ADMIN — OXYCODONE HYDROCHLORIDE 5 MG: 5 TABLET ORAL at 20:11

## 2018-01-01 RX ADMIN — SODIUM BICARBONATE 50 MEQ: 84 INJECTION, SOLUTION INTRAVENOUS at 23:34

## 2018-01-01 RX ADMIN — PIPERACILLIN SODIUM AND TAZOBACTAM SODIUM 3.38 G: 3; .375 INJECTION, POWDER, FOR SOLUTION INTRAVENOUS at 14:00

## 2018-01-01 RX ADMIN — PIPERACILLIN SODIUM AND TAZOBACTAM SODIUM 3.38 G: 3; .375 INJECTION, POWDER, FOR SOLUTION INTRAVENOUS at 08:23

## 2018-01-01 RX ADMIN — PIPERACILLIN SODIUM AND TAZOBACTAM SODIUM 3.38 G: 3; .375 INJECTION, POWDER, FOR SOLUTION INTRAVENOUS at 14:14

## 2018-01-01 RX ADMIN — EPINEPHRINE 1 MG: 0.1 INJECTION, SOLUTION ENDOTRACHEAL; INTRACARDIAC; INTRAVENOUS at 23:20

## 2018-01-01 RX ADMIN — SODIUM BICARBONATE 50 MEQ: 84 INJECTION, SOLUTION INTRAVENOUS at 22:44

## 2018-01-01 RX ADMIN — CEFTRIAXONE 2 G: 2 INJECTION, POWDER, FOR SOLUTION INTRAMUSCULAR; INTRAVENOUS at 09:11

## 2018-01-01 RX ADMIN — PIPERACILLIN SODIUM AND TAZOBACTAM SODIUM 3.38 G: 3; .375 INJECTION, POWDER, FOR SOLUTION INTRAVENOUS at 20:16

## 2018-01-01 RX ADMIN — OXYCODONE HYDROCHLORIDE 5 MG: 5 TABLET ORAL at 16:01

## 2018-01-01 RX ADMIN — CYCLOBENZAPRINE 10 MG: 10 TABLET, FILM COATED ORAL at 11:38

## 2018-01-01 RX ADMIN — SODIUM BICARBONATE 50 MEQ: 84 INJECTION, SOLUTION INTRAVENOUS at 23:21

## 2018-01-01 RX ADMIN — CEFTRIAXONE 2 G: 2 INJECTION, POWDER, FOR SOLUTION INTRAMUSCULAR; INTRAVENOUS at 08:45

## 2018-01-01 RX ADMIN — PIPERACILLIN SODIUM AND TAZOBACTAM SODIUM 3.38 G: 3; .375 INJECTION, POWDER, FOR SOLUTION INTRAVENOUS at 20:11

## 2018-01-01 RX ADMIN — ENOXAPARIN SODIUM 100 MG: 100 INJECTION SUBCUTANEOUS at 08:23

## 2018-01-01 RX ADMIN — VECURONIUM BROMIDE 10 MG: 10 INJECTION, POWDER, LYOPHILIZED, FOR SOLUTION INTRAVENOUS at 20:40

## 2018-01-01 RX ADMIN — PIPERACILLIN SODIUM AND TAZOBACTAM SODIUM 3.38 G: 3; .375 INJECTION, POWDER, FOR SOLUTION INTRAVENOUS at 20:09

## 2018-01-01 RX ADMIN — PIPERACILLIN AND TAZOBACTAM 3.38 G: 3; .375 INJECTION, POWDER, LYOPHILIZED, FOR SOLUTION INTRAVENOUS; PARENTERAL at 01:23

## 2018-01-01 RX ADMIN — DEXTROSE MONOHYDRATE 50 ML: 25 INJECTION, SOLUTION INTRAVENOUS at 22:48

## 2018-01-01 RX ADMIN — IPRATROPIUM BROMIDE AND ALBUTEROL SULFATE 3 ML: .5; 3 SOLUTION RESPIRATORY (INHALATION) at 18:44

## 2018-01-01 RX ADMIN — LORAZEPAM 1 MG: 2 INJECTION INTRAMUSCULAR; INTRAVENOUS at 20:03

## 2018-01-01 RX ADMIN — EPINEPHRINE 1 MCG/MIN: 1 INJECTION, SOLUTION INTRAMUSCULAR; SUBCUTANEOUS at 23:13

## 2018-01-01 RX ADMIN — IOHEXOL 70 ML: 300 INJECTION, SOLUTION INTRAVENOUS at 13:58

## 2018-01-01 RX ADMIN — MIDAZOLAM 2 MG: 1 INJECTION INTRAMUSCULAR; INTRAVENOUS at 09:30

## 2018-01-01 RX ADMIN — OXYCODONE HYDROCHLORIDE 2.5 MG: 5 TABLET ORAL at 10:50

## 2018-01-01 RX ADMIN — MAGNESIUM HYDROXIDE 30 ML: 400 SUSPENSION ORAL at 09:00

## 2018-01-01 RX ADMIN — PIPERACILLIN SODIUM AND TAZOBACTAM SODIUM 3.38 G: 3; .375 INJECTION, POWDER, FOR SOLUTION INTRAVENOUS at 03:57

## 2018-01-01 RX ADMIN — SODIUM BICARBONATE: 84 INJECTION, SOLUTION INTRAVENOUS at 21:50

## 2018-01-01 RX ADMIN — SODIUM BICARBONATE 50 MEQ: 84 INJECTION, SOLUTION INTRAVENOUS at 23:27

## 2018-01-01 RX ADMIN — PIPERACILLIN SODIUM AND TAZOBACTAM SODIUM 3.38 G: 3; .375 INJECTION, POWDER, FOR SOLUTION INTRAVENOUS at 05:30

## 2018-01-01 RX ADMIN — MIDAZOLAM 2 MG: 1 INJECTION INTRAMUSCULAR; INTRAVENOUS at 09:34

## 2018-01-01 RX ADMIN — OXYCODONE HYDROCHLORIDE 5 MG: 5 TABLET ORAL at 09:23

## 2018-01-01 RX ADMIN — SODIUM CHLORIDE 500 ML: 9 INJECTION, SOLUTION INTRAVENOUS at 11:16

## 2018-01-01 RX ADMIN — SODIUM BICARBONATE: 84 INJECTION, SOLUTION INTRAVENOUS at 23:43

## 2018-01-01 RX ADMIN — EPINEPHRINE 1 MG: 0.1 INJECTION, SOLUTION ENDOTRACHEAL; INTRACARDIAC; INTRAVENOUS at 23:22

## 2018-01-01 RX ADMIN — SODIUM BICARBONATE 50 MEQ: 84 INJECTION, SOLUTION INTRAVENOUS at 23:18

## 2018-01-01 RX ADMIN — LIDOCAINE HYDROCHLORIDE: 20 INJECTION, SOLUTION INFILTRATION; PERINEURAL at 13:06

## 2018-01-01 RX ADMIN — NOREPINEPHRINE BITARTRATE 10 MCG/MIN: 1 INJECTION INTRAVENOUS at 22:59

## 2018-01-01 RX ADMIN — SODIUM BICARBONATE 50 MEQ: 84 INJECTION, SOLUTION INTRAVENOUS at 22:39

## 2018-01-01 RX ADMIN — OXYCODONE HYDROCHLORIDE 5 MG: 5 TABLET ORAL at 19:28

## 2018-01-01 RX ADMIN — OXYCODONE HYDROCHLORIDE 5 MG: 5 TABLET ORAL at 20:16

## 2018-01-01 RX ADMIN — OXYCODONE HYDROCHLORIDE 5 MG: 5 TABLET ORAL at 05:33

## 2018-01-01 RX ADMIN — ENOXAPARIN SODIUM 100 MG: 100 INJECTION SUBCUTANEOUS at 20:16

## 2018-01-01 RX ADMIN — SODIUM CHLORIDE: 9 INJECTION, SOLUTION INTRAVENOUS at 03:41

## 2018-01-01 RX ADMIN — SODIUM CHLORIDE, POTASSIUM CHLORIDE, SODIUM LACTATE AND CALCIUM CHLORIDE 1000 ML: 600; 310; 30; 20 INJECTION, SOLUTION INTRAVENOUS at 23:06

## 2018-01-01 RX ADMIN — PIPERACILLIN SODIUM AND TAZOBACTAM SODIUM 3.38 G: 3; .375 INJECTION, POWDER, FOR SOLUTION INTRAVENOUS at 12:28

## 2018-01-01 RX ADMIN — PIPERACILLIN SODIUM AND TAZOBACTAM SODIUM 3.38 G: 3; .375 INJECTION, POWDER, FOR SOLUTION INTRAVENOUS at 05:41

## 2018-01-01 RX ADMIN — SODIUM CHLORIDE 500 ML: 9 INJECTION, SOLUTION INTRAVENOUS at 12:06

## 2018-01-01 RX ADMIN — EPINEPHRINE 1 MG: 0.1 INJECTION, SOLUTION ENDOTRACHEAL; INTRACARDIAC; INTRAVENOUS at 23:44

## 2018-01-01 RX ADMIN — CEFTRIAXONE SODIUM 1 G: 1 INJECTION, POWDER, FOR SOLUTION INTRAMUSCULAR; INTRAVENOUS at 13:00

## 2018-01-01 RX ADMIN — PIPERACILLIN SODIUM AND TAZOBACTAM SODIUM 3.38 G: 3; .375 INJECTION, POWDER, FOR SOLUTION INTRAVENOUS at 13:27

## 2018-01-01 RX ADMIN — PIPERACILLIN SODIUM AND TAZOBACTAM SODIUM 3.38 G: 3; .375 INJECTION, POWDER, FOR SOLUTION INTRAVENOUS at 20:46

## 2018-01-01 RX ADMIN — EPINEPHRINE 1 MG: 0.1 INJECTION, SOLUTION ENDOTRACHEAL; INTRACARDIAC; INTRAVENOUS at 23:32

## 2018-01-01 RX ADMIN — PIPERACILLIN SODIUM AND TAZOBACTAM SODIUM 3.38 G: 3; .375 INJECTION, POWDER, FOR SOLUTION INTRAVENOUS at 04:25

## 2018-01-01 RX ADMIN — EPINEPHRINE 1 MG: 0.1 INJECTION, SOLUTION ENDOTRACHEAL; INTRACARDIAC; INTRAVENOUS at 22:39

## 2018-01-01 RX ADMIN — PIPERACILLIN SODIUM AND TAZOBACTAM SODIUM 3.38 G: 3; .375 INJECTION, POWDER, FOR SOLUTION INTRAVENOUS at 21:00

## 2018-01-01 RX ADMIN — EPINEPHRINE 1 MG: 0.1 INJECTION, SOLUTION ENDOTRACHEAL; INTRACARDIAC; INTRAVENOUS at 23:37

## 2018-01-01 RX ADMIN — FENTANYL CITRATE 50 MCG: 50 INJECTION, SOLUTION INTRAMUSCULAR; INTRAVENOUS at 14:39

## 2018-01-01 RX ADMIN — ENOXAPARIN SODIUM 100 MG: 100 INJECTION SUBCUTANEOUS at 08:03

## 2018-01-01 RX ADMIN — SODIUM BICARBONATE 50 MEQ: 84 INJECTION, SOLUTION INTRAVENOUS at 23:23

## 2018-01-01 RX ADMIN — PIPERACILLIN SODIUM AND TAZOBACTAM SODIUM 3.38 G: 3; .375 INJECTION, POWDER, FOR SOLUTION INTRAVENOUS at 13:45

## 2018-01-01 RX ADMIN — PIPERACILLIN AND TAZOBACTAM 4.5 G: 4; .5 INJECTION, POWDER, LYOPHILIZED, FOR SOLUTION INTRAVENOUS; PARENTERAL at 21:17

## 2018-01-01 RX ADMIN — EPINEPHRINE 1 MG: 0.1 INJECTION, SOLUTION ENDOTRACHEAL; INTRACARDIAC; INTRAVENOUS at 22:41

## 2018-01-01 RX ADMIN — SODIUM BICARBONATE 50 MEQ: 84 INJECTION, SOLUTION INTRAVENOUS at 23:36

## 2018-01-01 RX ADMIN — LIDOCAINE HYDROCHLORIDE: 20 INJECTION, SOLUTION INFILTRATION; PERINEURAL at 09:34

## 2018-01-01 RX ADMIN — FENTANYL CITRATE 25 MCG: 50 INJECTION, SOLUTION INTRAMUSCULAR; INTRAVENOUS at 09:34

## 2018-01-01 RX ADMIN — CEFTRIAXONE SODIUM 2 G: 2 INJECTION, POWDER, FOR SOLUTION INTRAMUSCULAR; INTRAVENOUS at 19:57

## 2018-01-01 RX ADMIN — CYCLOBENZAPRINE 10 MG: 10 TABLET, FILM COATED ORAL at 19:36

## 2018-01-01 RX ADMIN — SODIUM CHLORIDE: 9 INJECTION, SOLUTION INTRAVENOUS at 05:20

## 2018-01-01 RX ADMIN — SODIUM BICARBONATE 50 MEQ: 84 INJECTION, SOLUTION INTRAVENOUS at 23:44

## 2018-01-01 RX ADMIN — PIPERACILLIN SODIUM AND TAZOBACTAM SODIUM 3.38 G: 3; .375 INJECTION, POWDER, FOR SOLUTION INTRAVENOUS at 05:24

## 2018-01-01 RX ADMIN — SODIUM BICARBONATE 50 MEQ: 84 INJECTION, SOLUTION INTRAVENOUS at 23:25

## 2018-01-01 RX ADMIN — POLYETHYLENE GLYCOL 3350 1 PACKET: 17 POWDER, FOR SOLUTION ORAL at 05:35

## 2018-01-01 RX ADMIN — SODIUM BICARBONATE 50 MEQ: 84 INJECTION, SOLUTION INTRAVENOUS at 23:01

## 2018-01-01 RX ADMIN — CYCLOBENZAPRINE 10 MG: 10 TABLET, FILM COATED ORAL at 10:33

## 2018-01-01 RX ADMIN — IOHEXOL 100 ML: 350 INJECTION, SOLUTION INTRAVENOUS at 01:47

## 2018-01-01 ASSESSMENT — PAIN SCALES - GENERAL
PAINLEVEL_OUTOF10: 5
PAINLEVEL_OUTOF10: 5
PAINLEVEL_OUTOF10: 0
PAINLEVEL_OUTOF10: 8
PAINLEVEL_OUTOF10: 2
PAINLEVEL_OUTOF10: 5
PAINLEVEL_OUTOF10: 2
PAINLEVEL_OUTOF10: 3
PAINLEVEL_OUTOF10: 2
PAINLEVEL_OUTOF10: 10
PAINLEVEL_OUTOF10: 0
PAINLEVEL_OUTOF10: 8
PAINLEVEL_OUTOF10: 3
PAINLEVEL_OUTOF10: 2
PAINLEVEL_OUTOF10: 5
PAINLEVEL_OUTOF10: 7
PAINLEVEL_OUTOF10: 0
PAINLEVEL_OUTOF10: 0
PAINLEVEL_OUTOF10: 7
PAINLEVEL_OUTOF10: 2
PAINLEVEL_OUTOF10: 6
PAINLEVEL_OUTOF10: 7
PAINLEVEL_OUTOF10: 2
PAINLEVEL_OUTOF10: 4
PAINLEVEL_OUTOF10: 2
PAINLEVEL_OUTOF10: 2
PAINLEVEL_OUTOF10: 3
PAINLEVEL_OUTOF10: 5
PAINLEVEL_OUTOF10: 2
PAINLEVEL_OUTOF10: 6
PAINLEVEL_OUTOF10: 0
PAINLEVEL_OUTOF10: 4
PAINLEVEL_OUTOF10: 0
PAINLEVEL_OUTOF10: 2
PAINLEVEL_OUTOF10: 0
PAINLEVEL_OUTOF10: 5
PAINLEVEL_OUTOF10: 2
PAINLEVEL_OUTOF10: 2
PAINLEVEL_OUTOF10: 8
PAINLEVEL_OUTOF10: 2
PAINLEVEL_OUTOF10: 8
PAINLEVEL_OUTOF10: 7
PAINLEVEL_OUTOF10: 3
PAINLEVEL_OUTOF10: 3
PAINLEVEL_OUTOF10: 4
PAINLEVEL_OUTOF10: 2
PAINLEVEL_OUTOF10: 6
PAINLEVEL_OUTOF10: 0
PAINLEVEL_OUTOF10: ASSUMED PAIN PRESENT
PAINLEVEL_OUTOF10: 10
PAINLEVEL_OUTOF10: 8

## 2018-01-01 ASSESSMENT — ENCOUNTER SYMPTOMS
CONSTIPATION: 0
CLAUDICATION: 0
BLURRED VISION: 0
HEARTBURN: 0
INSOMNIA: 0
NAUSEA: 0
PHOTOPHOBIA: 0
INSOMNIA: 0
WEAKNESS: 0
CHILLS: 0
HEADACHES: 0
VOMITING: 0
DIARRHEA: 0
BLOOD IN STOOL: 0
BRUISES/BLEEDS EASILY: 0
SEIZURES: 0
VOMITING: 0
COUGH: 0
ABDOMINAL PAIN: 1
HEADACHES: 0
BLOOD IN STOOL: 0
CHILLS: 0
SPUTUM PRODUCTION: 0
HEMOPTYSIS: 0
COUGH: 0
MYALGIAS: 0
WEIGHT LOSS: 1
CONSTIPATION: 0
HEARTBURN: 0
DIARRHEA: 0
ABDOMINAL PAIN: 1
WEIGHT LOSS: 1
SPEECH CHANGE: 0
NERVOUS/ANXIOUS: 0
DOUBLE VISION: 0
PHOTOPHOBIA: 0
COUGH: 0
DEPRESSION: 0
WHEEZING: 0
SHORTNESS OF BREATH: 0
SHORTNESS OF BREATH: 0
NERVOUS/ANXIOUS: 0
HEADACHES: 0
INSOMNIA: 0
SHORTNESS OF BREATH: 0
INSOMNIA: 0
NERVOUS/ANXIOUS: 0
SENSORY CHANGE: 0
SENSORY CHANGE: 0
BLURRED VISION: 0
SHORTNESS OF BREATH: 0
MYALGIAS: 0
SENSORY CHANGE: 0
DIZZINESS: 0
WEIGHT LOSS: 1
NAUSEA: 0
ORTHOPNEA: 0
HEADACHES: 0
SHORTNESS OF BREATH: 0
CONSTIPATION: 0
CONSTIPATION: 0
ABDOMINAL PAIN: 1
EYE PAIN: 0
CONSTIPATION: 0
CLAUDICATION: 0
HEADACHES: 0
SENSORY CHANGE: 0
DEPRESSION: 0
EYE REDNESS: 0
WEAKNESS: 0
SENSORY CHANGE: 0
PHOTOPHOBIA: 0
CLAUDICATION: 0
SHORTNESS OF BREATH: 0
FEVER: 0
DIZZINESS: 0
HEADACHES: 0
HEARTBURN: 0
VOMITING: 0
ABDOMINAL PAIN: 1
VOMITING: 0
SPEECH CHANGE: 0
DIARRHEA: 0
MYALGIAS: 0
HEADACHES: 0
INSOMNIA: 0
FEVER: 0
HEADACHES: 0
ABDOMINAL PAIN: 1
SHORTNESS OF BREATH: 0
DEPRESSION: 0
PALPITATIONS: 0
CHILLS: 0
DIZZINESS: 0
NAUSEA: 0
HEADACHES: 0
DIZZINESS: 0
CHILLS: 0
PALPITATIONS: 0
CLAUDICATION: 0
SHORTNESS OF BREATH: 1
NAUSEA: 0
DIAPHORESIS: 0
BLURRED VISION: 0
NECK PAIN: 0
ABDOMINAL PAIN: 1
WEAKNESS: 0
FEVER: 0
FEVER: 0
HEARTBURN: 0
BLURRED VISION: 0
SPEECH CHANGE: 0
BACK PAIN: 0
ABDOMINAL PAIN: 1
FEVER: 0
PHOTOPHOBIA: 0
SPEECH CHANGE: 0
HEARTBURN: 0
NERVOUS/ANXIOUS: 0
DIARRHEA: 0
MYALGIAS: 0
ABDOMINAL PAIN: 0
COUGH: 0
WEAKNESS: 1
EYES NEGATIVE: 1
PHOTOPHOBIA: 0
VOMITING: 0
CONSTIPATION: 0
DIZZINESS: 0
FEVER: 0
HEARTBURN: 0
PHOTOPHOBIA: 0
SPUTUM PRODUCTION: 0
NAUSEA: 0
LOSS OF CONSCIOUSNESS: 0
DEPRESSION: 0
WHEEZING: 0
HEARTBURN: 0
WEAKNESS: 1
NAUSEA: 0
STRIDOR: 0
WEAKNESS: 1
BLURRED VISION: 0
EYE DISCHARGE: 0
MYALGIAS: 0
NERVOUS/ANXIOUS: 0
SPEECH CHANGE: 0
SPUTUM PRODUCTION: 0
DIARRHEA: 0
SENSORY CHANGE: 0
DEPRESSION: 0
FLANK PAIN: 0
DEPRESSION: 0
BLURRED VISION: 0
NAUSEA: 0
DIARRHEA: 0
WEAKNESS: 0
DIARRHEA: 0
CHILLS: 0
CLAUDICATION: 0
ABDOMINAL PAIN: 1
WEAKNESS: 0
SPEECH CHANGE: 0
SPUTUM PRODUCTION: 0
DIZZINESS: 0
WEAKNESS: 0
VOMITING: 0
COUGH: 1
HEADACHES: 0
MYALGIAS: 0
COUGH: 0
SHORTNESS OF BREATH: 0
BLURRED VISION: 0
SEIZURES: 0
NECK PAIN: 0
CHILLS: 0
GASTROINTESTINAL NEGATIVE: 1
COUGH: 0
VOMITING: 0
BACK PAIN: 0
WHEEZING: 0
MYALGIAS: 0
CONSTIPATION: 0
BRUISES/BLEEDS EASILY: 0
MYALGIAS: 0
DIZZINESS: 0
DIZZINESS: 0
DIARRHEA: 0
ABDOMINAL PAIN: 1
SENSORY CHANGE: 0
BLURRED VISION: 0
DIAPHORESIS: 0
SHORTNESS OF BREATH: 0
DIARRHEA: 0
FEVER: 0
MUSCULOSKELETAL NEGATIVE: 1
CHILLS: 0
COUGH: 0
CLAUDICATION: 0
MYALGIAS: 0
NERVOUS/ANXIOUS: 0
CLAUDICATION: 0
INSOMNIA: 0
DIAPHORESIS: 0
HEARTBURN: 0
SORE THROAT: 0
ORTHOPNEA: 0
CHILLS: 0
FEVER: 0
FEVER: 0
CONSTIPATION: 0
CHILLS: 0
NAUSEA: 0
MYALGIAS: 0
CHILLS: 0
FEVER: 0
CLAUDICATION: 0
CARDIOVASCULAR NEGATIVE: 1
INSOMNIA: 0
NAUSEA: 0
FOCAL WEAKNESS: 0
DEPRESSION: 0
DEPRESSION: 0
NAUSEA: 0
FOCAL WEAKNESS: 0
DOUBLE VISION: 0
WEAKNESS: 0
INSOMNIA: 0
RESPIRATORY NEGATIVE: 1
VOMITING: 0
INSOMNIA: 0
VOMITING: 0
NERVOUS/ANXIOUS: 0
PHOTOPHOBIA: 0
PHOTOPHOBIA: 0
LOSS OF CONSCIOUSNESS: 0
PALPITATIONS: 0
HEARTBURN: 0
NERVOUS/ANXIOUS: 0
SPEECH CHANGE: 0
BLURRED VISION: 0
DIZZINESS: 0
VOMITING: 0
NERVOUS/ANXIOUS: 0
COUGH: 0
COUGH: 0

## 2018-01-01 ASSESSMENT — COPD QUESTIONNAIRES
DO YOU EVER COUGH UP ANY MUCUS OR PHLEGM?: YES, A FEW DAYS A WEEK OR MONTH
HAVE YOU SMOKED AT LEAST 100 CIGARETTES IN YOUR ENTIRE LIFE: YES
DURING THE PAST 4 WEEKS HOW MUCH DID YOU FEEL SHORT OF BREATH: NONE/LITTLE OF THE TIME
DURING THE PAST 4 WEEKS HOW MUCH DID YOU FEEL SHORT OF BREATH: SOME OF THE TIME
DO YOU EVER COUGH UP ANY MUCUS OR PHLEGM?: NO/ONLY WITH OCCASIONAL COLDS OR INFECTIONS
COPD SCREENING SCORE: 6

## 2018-01-01 ASSESSMENT — PATIENT HEALTH QUESTIONNAIRE - PHQ9
SUM OF ALL RESPONSES TO PHQ9 QUESTIONS 1 AND 2: 0
1. LITTLE INTEREST OR PLEASURE IN DOING THINGS: NOT AT ALL
SUM OF ALL RESPONSES TO PHQ9 QUESTIONS 1 AND 2: 0
2. FEELING DOWN, DEPRESSED, IRRITABLE, OR HOPELESS: NOT AT ALL
2. FEELING DOWN, DEPRESSED, IRRITABLE, OR HOPELESS: NOT AT ALL
SUM OF ALL RESPONSES TO PHQ9 QUESTIONS 1 AND 2: 0
1. LITTLE INTEREST OR PLEASURE IN DOING THINGS: NOT AT ALL
2. FEELING DOWN, DEPRESSED, IRRITABLE, OR HOPELESS: NOT AT ALL
SUM OF ALL RESPONSES TO PHQ9 QUESTIONS 1 AND 2: 0
1. LITTLE INTEREST OR PLEASURE IN DOING THINGS: NOT AT ALL
SUM OF ALL RESPONSES TO PHQ9 QUESTIONS 1 AND 2: 0
SUM OF ALL RESPONSES TO PHQ9 QUESTIONS 1 AND 2: 0
2. FEELING DOWN, DEPRESSED, IRRITABLE, OR HOPELESS: NOT AT ALL
1. LITTLE INTEREST OR PLEASURE IN DOING THINGS: NOT AT ALL
2. FEELING DOWN, DEPRESSED, IRRITABLE, OR HOPELESS: NOT AT ALL
SUM OF ALL RESPONSES TO PHQ9 QUESTIONS 1 AND 2: 0
2. FEELING DOWN, DEPRESSED, IRRITABLE, OR HOPELESS: NOT AT ALL
2. FEELING DOWN, DEPRESSED, IRRITABLE, OR HOPELESS: NOT AT ALL

## 2018-01-01 ASSESSMENT — PULMONARY FUNCTION TESTS: EPAP_CMH2O: 8

## 2018-01-01 ASSESSMENT — LIFESTYLE VARIABLES
SUBSTANCE_ABUSE: 0
EVER_SMOKED: YES
ALCOHOL_USE: NO

## 2018-06-03 PROBLEM — A41.9 SEPSIS (HCC): Status: ACTIVE | Noted: 2018-01-01

## 2018-06-03 PROBLEM — I81 PORTAL VEIN THROMBOSIS: Status: ACTIVE | Noted: 2018-01-01

## 2018-06-03 PROBLEM — C79.9 METASTATIC CANCER (HCC): Status: ACTIVE | Noted: 2018-01-01

## 2018-06-03 PROBLEM — K81.0 CHOLECYSTITIS, ACUTE: Status: ACTIVE | Noted: 2018-01-01

## 2018-06-03 PROBLEM — R17 ELEVATED BILIRUBIN: Status: ACTIVE | Noted: 2018-01-01

## 2018-06-03 PROBLEM — R74.01 ELEVATED TRANSAMINASE LEVEL: Status: ACTIVE | Noted: 2018-01-01

## 2018-06-03 NOTE — ED TRIAGE NOTES
Pt BIB family with generalized illness for approx 2 months. Lack of appetite, weight loss. Pt denies abd pain. Pt appears weak.

## 2018-06-03 NOTE — DISCHARGE PLANNING
Medical Social Worker    Pt discussed in rounds. Per attending MD, pt has no SW needs at this time.

## 2018-06-03 NOTE — PROGRESS NOTES
Patient was seen and examined during round. Admitted earlier today for likely metastatic lung and liver lesion on CT new onset. History of remote smoking. Never had colonoscopy done in the past.  Will consult oncology.

## 2018-06-03 NOTE — ASSESSMENT & PLAN NOTE
Ultrasound findings concerning for acalculous cholecystitis in the setting metastatic cancer, exam not consistent with acute rocio.  Minimal pain  Surgery Dr Roth was consulted, no surgical intervention recommended at this time

## 2018-06-03 NOTE — ASSESSMENT & PLAN NOTE
Likely secondary to obstruction from metastatic cancer  Continue to monitor CMP  Post 2 perc biliary stents - having pain at insertion sites - improved following bowel prep.  We will need continued drain care on discharge to home  Follow up with oncology and GI for further recommendations  Nursing to educate on drain care and home health ordered

## 2018-06-03 NOTE — CONSULTS
General Surgery Consult    CHIEF COMPLAINT: Weight loss weakness and yellowing of the eyes.     HISTORY OF PRESENT ILLNESS: The patient is a 67 y.o. male, who presents with the above complaints. He lost a significant amount of weight without intent over the past several months. He also noticed a generalized weakness. It became quite concerning and he presented to the emergency department. In the emergency department the patient underwent a workup to include a CAT scan revealing liver masses, pulmonary metastasis, gallbladder masses. He was also noted to have an obstructive picture on his liver function tests. It was also noticed to be jaundiced with scleral icterus.    The patient describes very mild right upper quadrant abdominal pain with deep palpation. There are no exacerbating or relieving factors to his symptoms at this point. General surgery was consulted emergently for the above findings.     PAST MEDICAL HISTORY:  none reported     PAST SURGICAL HISTORY: patient denies any surgical history     ALLERGIES: No Known Allergies     CURRENT MEDICATIONS:   Home Medications     Reviewed by Daniella Basurto R.N. (Registered Nurse) on 06/03/18 at 0516  Med List Status: Complete   Medication Last Dose Status        Patient [place] Taking any Medications                       FAMILY HISTORY: History reviewed. No pertinent family history.     SOCIAL HISTORY:   Social History     Social History Main Topics   • Smoking status: Never Smoker   • Smokeless tobacco: Never Used   • Alcohol use Not on file   • Drug use: Unknown   • Sexual activity: Not on file       REVIEW OF SYSTEMS: Comprehensive review of systems was negative aside from the above HPI.     PHYSICAL EXAMINATION:     GENERAL: The patient is in no acute distress.   VITAL SIGNS: Blood pressure 126/85, pulse 81, temperature 37.1 °C (98.7 °F), resp. rate 19, height 1.829 m (6'), weight 97.1 kg (214 lb 1.1 oz), SpO2 93 %.  HEAD AND NECK: Scleral icterus  NECK:  Supple.  CHEST:No respiratory distress.    CARDIOVASCULAR: Regular rate. The extremities are well perfused.   ABDOMEN: Mildly tender to deep palpation in the right upper quadrant. No guarding or rebound. Mass effect of the right upper quadrant.   EXTREMITIES: Examination of the upper and lower extremities demonstrates no cyanosis edema or clubbing.  NEUROLOGIC: Alert & oriented x 3, Normal motor function, Normal sensory function, No focal deficits noted.  Skin: Patient is jaundiced    LABORATORY VALUES:   Recent Labs      06/02/18   2218   WBC  13.1*   RBC  3.70*   HEMOGLOBIN  11.5*   HEMATOCRIT  34.3*   MCV  92.7   MCH  31.1   MCHC  33.5*   RDW  64.6*   PLATELETCT  363   MPV  9.3     Recent Labs      06/02/18   2218   SODIUM  128*   POTASSIUM  3.8   CHLORIDE  97   CO2  21   GLUCOSE  122*   BUN  15   CREATININE  0.87   CALCIUM  8.6     Recent Labs      06/02/18   2218   ASTSGOT  80*   ALTSGPT  50   TBILIRUBIN  16.0*   ALKPHOSPHAT  228*   GLOBULIN  5.3*            IMAGING:   CT-CHEST,ABDOMEN,PELVIS WITH   Final Result         1.  Extensive bilateral pulmonary masses, appearance compatible with diffuse metastatic disease.   2.  Numerous hyperdense regular masses along the gallbladder wall, could represent primary versus metastatic gallbladder neoplasia.   3.  Large irregular low-density hepatic mass in the right hepatic lobe, could represent primary versus metastatic neoplasia..   4.  Moderate intrahepatic biliary ductal dilatation is seen, compatible with biliary obstruction.   5.  Small filling defect at the bifurcation of the portal vein extending into right and left portal veins, appearance compatible with small thrombus. Could represent tumoral thrombus.   6.  Multiple mildly enlarged mediastinal lymph nodes, could represent metastatic disease.   7.  Fat-containing left inguinal hernia      US-GALLBLADDER   Final Result         1.  Gallbladder sludge and echogenic material along the gallbladder wall suggesting  tumefactive sludge with borderline gallbladder wall thickening and trace pericholecystic fluid. Findings equivocal but raises concern for possible acalculous    cholecystitis.   2.  Hepatomegaly      DX-CHEST-PORTABLE (1 VIEW)   Final Result         1.  Extensive pulmonary nodules throughout the lungs, appearance most compatible with diffuse metastatic disease.      These findings were discussed with the patient's clinician, VIRGINIA RAMAN, on 6/2/2018 10:39 PM.          IMPRESSION AND PLAN:     1. Metastatic carcinoma of unknown origin.    Plan:    1. There is no surgical indication at this point. He has no gallstones and has masses on the gallbladder (metastatic or primary). It would be perilous to attempt cholecystectomy at this point. The risks of cholecystectomy far outweigh the benefits at this point.    2. Agree with oncology consult for workup for primary and consideration of palliative therapy.    3. Consider GI consultation for consideration of palliative stenting. He does appear obstructed.    4. Surgery signing off. Please reconsult as necessary.         ___________________________________   John Roth M.D.    DD: 6/3/2018 DT: 10:04 AM

## 2018-06-03 NOTE — ED PROVIDER NOTES
ED Provider Note    CHIEF COMPLAINT  Chief Complaint   Patient presents with   • Weakness   • Weight Loss       HPI  Eliceo Vyas is a 67 y.o. male who presents to the ER for weakness and weight loss.  Symptoms have been present for at least 2 months, maybe longer.  Family notes he seems to be falling down the snow in the winter.  Since that time.  He is a progressive weakness.  He is a poor appetite and weight loss.  And recently he appears jaundiced.  He has no pain.  No shortness of breath, cough or fever.  Feels generally weak and ill.  Has not seen a doctor for this.  Has not had routine primary care.  Denies any bloody or black stools.  Denies any sick contacts.   Denies any other acute concerns or complaints.    REVIEW OF SYSTEMS  See HPI for further details. All other systems are negative.    PAST MEDICAL HISTORY  History reviewed. No pertinent past medical history.    FAMILY HISTORY  History reviewed. No pertinent family history.    SOCIAL HISTORY  Social History     Social History   • Marital status: N/A     Spouse name: N/A   • Number of children: N/A   • Years of education: N/A     Social History Main Topics   • Smoking status: Never Smoker   • Smokeless tobacco: Never Used   • Alcohol use Not on file   • Drug use: Unknown   • Sexual activity: Not on file     Other Topics Concern   • Not on file     Social History Narrative   • No narrative on file       SURGICAL HISTORY  History reviewed. No pertinent surgical history.    CURRENT MEDICATIONS  Home Medications    **Home medications have not yet been reviewed for this encounter**         ALLERGIES  No Known Allergies    PHYSICAL EXAM  VITAL SIGNS: /66   Pulse 98   Temp 36.7 °C (98.1 °F)   Resp 16   Wt 97.1 kg (214 lb 1.1 oz)   SpO2 92%    Constitutional: Awake, ill-appearing, no acute distress.  HENT: Normocephalic, Atraumatic, Bilateral external ears normal, Oropharynx moist, No oral exudates, Nose normal.   Eyes: PERRL, EOMI, Conjunctiva  normal.  Anicteric sclera  Neck: Normal range of motion, No tenderness, Supple, No stridor.   Cardiovascular: Normal heart rate, Normal rhythm, No murmurs, No rubs, No gallops.   Thorax & Lungs: Normal breath sounds, No respiratory distress, No wheezing, No chest tenderness.   Abdomen: Bowel sounds normal, Soft, No tenderness,   Skin: Warm, Dry, No erythema, No rash.   Musculoskeletal: Good range of motion in all major joints.   Neurologic: Alert & oriented x 3No focal deficits noted.   Psychiatric: Affect normal        RADIOLOGY/PROCEDURES  US-GALLBLADDER   Final Result         1.  Gallbladder sludge and echogenic material along the gallbladder wall suggesting tumefactive sludge with borderline gallbladder wall thickening and trace pericholecystic fluid. Findings equivocal but raises concern for possible acalculous    cholecystitis.   2.  Hepatomegaly      DX-CHEST-PORTABLE (1 VIEW)   Final Result         1.  Extensive pulmonary nodules throughout the lungs, appearance most compatible with diffuse metastatic disease.      These findings were discussed with the patient's clinician, VIRGINIA RAMAN, on 6/2/2018 10:39 PM.      CT-CHEST,ABDOMEN,PELVIS WITH    (Results Pending)     Results for orders placed or performed during the hospital encounter of 06/02/18   CBC WITH DIFFERENTIAL   Result Value Ref Range    WBC 13.1 (H) 4.8 - 10.8 K/uL    RBC 3.70 (L) 4.70 - 6.10 M/uL    Hemoglobin 11.5 (L) 14.0 - 18.0 g/dL    Hematocrit 34.3 (L) 42.0 - 52.0 %    MCV 92.7 81.4 - 97.8 fL    MCH 31.1 27.0 - 33.0 pg    MCHC 33.5 (L) 33.7 - 35.3 g/dL    RDW 64.6 (H) 35.9 - 50.0 fL    Platelet Count 363 164 - 446 K/uL    MPV 9.3 9.0 - 12.9 fL    Neutrophils-Polys 75.90 (H) 44.00 - 72.00 %    Lymphocytes 12.20 (L) 22.00 - 41.00 %    Monocytes 10.60 0.00 - 13.40 %    Eosinophils 0.40 0.00 - 6.90 %    Basophils 0.40 0.00 - 1.80 %    Immature Granulocytes 0.50 0.00 - 0.90 %    Nucleated RBC 0.00 /100 WBC    Neutrophils (Absolute) 9.92 (H)  1.82 - 7.42 K/uL    Lymphs (Absolute) 1.60 1.00 - 4.80 K/uL    Monos (Absolute) 1.39 (H) 0.00 - 0.85 K/uL    Eos (Absolute) 0.05 0.00 - 0.51 K/uL    Baso (Absolute) 0.05 0.00 - 0.12 K/uL    Immature Granulocytes (abs) 0.06 0.00 - 0.11 K/uL    NRBC (Absolute) 0.00 K/uL   COMP METABOLIC PANEL   Result Value Ref Range    Sodium 128 (L) 135 - 145 mmol/L    Potassium 3.8 3.6 - 5.5 mmol/L    Chloride 97 96 - 112 mmol/L    Co2 21 20 - 33 mmol/L    Anion Gap 10.0 0.0 - 11.9    Glucose 122 (H) 65 - 99 mg/dL    Bun 15 8 - 22 mg/dL    Creatinine 0.87 0.50 - 1.40 mg/dL    Calcium 8.6 8.5 - 10.5 mg/dL    AST(SGOT) 80 (H) 12 - 45 U/L    ALT(SGPT) 50 2 - 50 U/L    Alkaline Phosphatase 228 (H) 30 - 99 U/L    Total Bilirubin 16.0 (H) 0.1 - 1.5 mg/dL    Albumin 2.7 (L) 3.2 - 4.9 g/dL    Total Protein 8.0 6.0 - 8.2 g/dL    Globulin 5.3 (H) 1.9 - 3.5 g/dL    A-G Ratio 0.5 g/dL   LIPASE   Result Value Ref Range    Lipase 35 11 - 82 U/L   TROPONIN   Result Value Ref Range    Troponin I 0.01 0.00 - 0.04 ng/mL   LACTIC ACID   Result Value Ref Range    Lactic Acid 2.5 (H) 0.5 - 2.0 mmol/L   URINALYSIS CULTURE, IF INDICATED   Result Value Ref Range    Color DK Yellow     Character Cloudy (A)     Specific Gravity 1.025 <1.035    Ph 5.5 5.0 - 8.0    Glucose Negative Negative mg/dL    Ketones Negative Negative mg/dL    Protein 30 (A) Negative mg/dL    Bilirubin Large (A) Negative    Urobilinogen, Urine 1.0 Negative    Nitrite Positive (A) Negative    Leukocyte Esterase Small (A) Negative    Occult Blood Negative Negative    Micro Urine Req Microscopic    TSH   Result Value Ref Range    TSH 1.000 0.380 - 5.330 uIU/mL   URINE MICROSCOPIC (W/UA)   Result Value Ref Range    WBC 2-5 (A) /hpf    RBC 10-20 (A) /hpf    Bacteria Negative None /hpf    Epithelial Cells Negative /hpf    Hyaline Cast 6-10 (A) /lpf    Granular Casts 3-5 (A) /lpf   ESTIMATED GFR   Result Value Ref Range    GFR If African American >60 >60 mL/min/1.73 m 2    GFR If Non African  American >60 >60 mL/min/1.73 m 2   EKG (NOW)   Result Value Ref Range    Report       [facility].    Test Date:  2018  Pt Name:    FRANK GOODEN                 Department: ER  MRN:        [id number]                      Room:        26  Gender:     Male                         Technician: [id number]  :        1950                   Requested By:VIRGINIA RAMAN  Order #:    [id number]                    Reading MD: VIRGINIA RAMAN. AMD    Measurements  Intervals                                Axis  Rate:       82                           P:  NH:                                      QRS:        -14  QRSD:       110                          T:          12  QT:         388  QTc:        453    Interpretive Statements  ACCELERATED JUNCTIONAL RHYTHM  NONSPECIFIC INTRAVENTRICULAR CONDUCTION DELAY  PROBABLE POSTERIOR INFARCT  BASELINE WANDER IN LEAD(S) I,III,aVL,aVF,V1,V2,V3,V4,V5  No previous ECG available for comparison    Electronically Signed On 6-3-2018 1:21:52 PDT by VIRGINIA RAMAN. AMD          COURSE & MEDICAL DECISION MAKING  Pertinent Labs & Imaging studies reviewed. (See chart for details)    Patient presents to emergency department for weakness, jaundice and weight loss.  This is very concerning for malignancy.  Workup was performed.  He has painless jaundice and signs of liver obstruction.  Ultrasound confirms these findings, and is also question for cholecystitis.  Because of the leukocytosis.  There is a concern for cholecystitis.  The patient will be covered with antibiotics for this.  He has no peritonitis or tenderness.  I suspect his inflammation is related to his underlying liver pathology.    Chest x-ray shows multiple metastases.    [place] ultrasound was performed.  No malignancy was identified.  Chest and pelvis CT was ordered.  A concern for pancreatic cancer.    The patient's urinalysis looks a little suspicious for UTI.  He has no dysuria.  He is  given an IV dose of antibiotics.    The patient will be admitted to the [facility] for continued workup and treatment.  I spoke with Dr. Jain.  He was concerned about the cholecystitis with his regular surgeon.  I spoke with Dr. Roth, who felt the patient did not require surgical consult at this time.  Requires further workup and admission and antibiotics.      .  The patient's CT is pending.  He'll be admitted for further workup and treatment.      Partner will check the CT results.  Patient will be admitted to the hospitalist for continued workup and treatment.    FINAL IMPRESSION  1. Jaundice    2. Malignant neoplasm metastatic to lung, unspecified laterality (HCC)          2.   3.         Electronically signed by: Mendel Wyatt, 6/2/2018 10:10 PM

## 2018-06-03 NOTE — PROGRESS NOTES
Bedside report received from noc RN, assumed pt care, assessment completed, pt aox4, pt reports pain of the right side upper abd but declines interventions, pt was on a clear liquid diet and was made NPO this morning for possible liver biopsy, updated pt and wife present at bedside of POC and agreeable, pt calls appropriately for assistance, bed in low position, call light and personal belongings within reach, hourly rounding in place, pt needs met.

## 2018-06-03 NOTE — CONSULTS
"Reason for PC Consult: Advance Care Planning    Assessment:    General:  67yr old Citizen of [place] male admitted 6/3/18 with weakness, unintentional weight loss and jaundice.  No stated or noted PMH.    Social- Patient resides in [place] with his spouse Leatha Vyas ([phone]) and their 13 & 16 yr children. Unknown employment status.  No history of alcohol or tobacco use.  Denies any previous history of illness.    Dyspnea: No-    Last BM: 06/03/18- Per RN assessment  Pain: Yes-    Depression: Mood appropriate for situation-    Dementia: No;       Spiritual:  Is Zoroastrianism or spirituality important for coping with this illness? No-  Spiritual support offered; denied at this time; \"We will let you know\"  Has a  or spiritual provider visit been requested? No      Palliative Performance Scale: 80%    Advance Directive: None on File, Denies completing one  DPOA: None on File- NOK - Leatha Vyas ([phone]), spouse  POLST: None on File    Code Status: Full-     Outcome:  I met with the patient and spouse, Leatha, at bedside(R311).  I introduced myself and the role of Palliative Care in POC. Patient and spouse speak english, stating primary language.  Patient's spouse explained the complications with the liver and \"cancer in his lungs\".  Stated that they were waiting on the doctors to \"do the test\" and see what they say.  The patient denies any previous history of illness or, just this \"pain\" around his RUQ.  Patient and spouse stated that they have never discussed medical decision making, medical management of an aggressive disease process or the \"what ifs\"; nor are they ready to discuss at this time.  Palliative Care will follow.    All questions were answered in full, stated understanding and agreed. Active listening, reflection, and empathic support utilized throughout this encounter. Contact for Palliative Care was provided and patient/family is encourage to call for questions, concerns and/or support.  "   Updated: MARIAELENA Morales    Plan: Liver bx, Oncology consult & GI consult/surgery note.      Recommendations: Hospice/Ethics referral is inappropriate at this time as the patient is actively seeking treatment.      Thank you for allowing Palliative Care to participate in this patient's care. Please feel free to call [phone] with any questions or concerns.

## 2018-06-03 NOTE — ASSESSMENT & PLAN NOTE
Liver biopsy 6/4  Pathology reports adenocarcinoma favoring upper GI origin  Oncology consulted  GI, Dr Jordan consulted for possible intervention, upper and lower endoscopies today - Dr Christianson at 11am  2 percutaneous drains placed in IR, will continue to trend bilirubin.  Lungs, liver, gallbladder mets    6/13, scope, polypectomy- path TV adenoma, high grade dysplasia, oncology to consult, appreciate input  f/u with Dr. Rossi as outpatient

## 2018-06-03 NOTE — ASSESSMENT & PLAN NOTE
Secondary to metastatic cancer  Hold lovenox secondary to intervention, will resume if appropriate by oncology.

## 2018-06-03 NOTE — H&P
Hospital Medicine History and Physical    Date of Service  6/3/2018    Chief Complaint  Chief Complaint   Patient presents with   • Weakness   • Weight Loss       History of Presenting Illness  67 y.o. male with no significant past medical history who presented 6/2/2018 with weight loss and weakness for the past 2 months. Patient reports progressively worsening generalized weakness, poor appetite and endorses a weight loss. He also reports right upper quadrant abdominal pain for the past 2 weeks. Pain is rated at 4/10 intensity. He denies any relieving or exacerbating factors. He denies any fevers but endorses some chills. He also notes some shortness of breath with a dry cough. He denies any chest pain, nausea, vomiting or diarrhea. He denies any history of smoking. He denies any exposure to fumes or chemicals. He denies any personal or family history of cancers.  In the ER he was found to be tachycardic. Initial workup revealed WBC 13.1, sodium 128, AST 80, alk phos 228, total bili 16, lactic acid 2.5.  Ultrasound gallbladder reveals: Gallbladder sludge and echogenic material along the gallbladder wall suggesting tumefactive sludge with borderline gallbladder wall thickening and trace pericholecystic fluid. Findings equivocal but raises concern for possible acalculous Cholecystitis  CT chest abdomen pelvis with IV contrast reveals : 1.  Extensive bilateral pulmonary masses, appearance compatible with diffuse metastatic disease.  2.  Numerous hyperdense regular masses along the gallbladder wall, could represent primary versus metastatic gallbladder neoplasia.  3.  Large irregular low-density hepatic mass in the right hepatic lobe, could represent primary versus metastatic neoplasia..  4.  Moderate intrahepatic biliary ductal dilatation is seen, compatible with biliary obstruction.  5.  Small filling defect at the bifurcation of the portal vein extending into right and left portal veins, appearance compatible with  small thrombus. Could represent tumoral thrombus.  6.  Multiple mildly enlarged mediastinal lymph nodes, could represent metastatic disease.  7.  Fat-containing left inguinal hernia    Surgery Dr. Roth was consulted by the ER physician who did not recommend any urgent surgical intervention at this time.     Primary Care Physician  No primary care provider on file.    Consultants  Palliative care    Code Status  Full code    Review of Systems  Review of Systems   Constitutional: Positive for malaise/fatigue and weight loss. Negative for chills, diaphoresis and fever.   HENT: Negative for hearing loss and sore throat.    Eyes: Negative for blurred vision and double vision.   Respiratory: Positive for cough and shortness of breath. Negative for sputum production and wheezing.    Cardiovascular: Negative for chest pain, palpitations and leg swelling.   Gastrointestinal: Positive for abdominal pain. Negative for blood in stool, diarrhea, melena, nausea and vomiting.   Genitourinary: Negative for dysuria, flank pain and urgency.   Musculoskeletal: Negative for back pain, joint pain, myalgias and neck pain.   Skin: Negative for rash.   Neurological: Positive for weakness. Negative for dizziness, focal weakness, seizures, loss of consciousness and headaches.   Endo/Heme/Allergies: Does not bruise/bleed easily.   Psychiatric/Behavioral: Negative for substance abuse.   All other systems reviewed and are negative.       Past Medical History  No pertinent past medical history    Surgical History  No pertinent surgical history    Medications  No current facility-administered medications on file prior to encounter.      No current outpatient prescriptions on file prior to encounter.     Not on any medications   Family History  History reviewed. No pertinent family history.    Social History  Social History   Substance Use Topics   • Smoking status: Never Smoker   • Smokeless tobacco: Never Used   • Alcohol use Not on file        Allergies  No Known Allergies     Physical Exam  Laboratory   Hemodynamics  Temp (24hrs), Av.7 °C (98.1 °F), Min:36.7 °C (98.1 °F), Max:36.7 °C (98.1 °F)   Temperature: 36.7 °C (98.1 °F)  Pulse  Av  Min: 98  Max: 98 Heart Rate (Monitored): 74  Blood Pressure : 110/66, NIBP: 123/73      Respiratory      Respiration: 16, Pulse Oximetry: 92 %             Physical Exam   Constitutional: He is oriented to person, place, and time. He appears well-developed and well-nourished. No distress.   HENT:   Head: Normocephalic and atraumatic.   Mouth/Throat: Oropharynx is clear and moist.   Eyes: Conjunctivae are normal. Pupils are equal, round, and reactive to light. No scleral icterus.   Neck: Normal range of motion. Neck supple.   Cardiovascular: Normal rate, regular rhythm and normal heart sounds.    Pulmonary/Chest: Effort normal. No respiratory distress. He has no wheezes. He has no rales.   Abdominal: Soft. He exhibits no distension. There is tenderness (mild right upper quadrant tenderness). There is no rebound.   Musculoskeletal: Normal range of motion. He exhibits no edema or tenderness.   Lymphadenopathy:     He has no cervical adenopathy.   Neurological: He is alert and oriented to person, place, and time. No cranial nerve deficit. Coordination normal.   Skin: Skin is warm. No rash noted.   Psychiatric: He has a normal mood and affect.   Nursing note and vitals reviewed.      Recent Labs      18   WBC  13.1*   RBC  3.70*   HEMOGLOBIN  11.5*   HEMATOCRIT  34.3*   MCV  92.7   MCH  31.1   MCHC  33.5*   RDW  64.6*   PLATELETCT  363   MPV  9.3     Recent Labs      18   SODIUM  128*   POTASSIUM  3.8   CHLORIDE  97   CO2  21   GLUCOSE  122*   BUN  15   CREATININE  0.87   CALCIUM  8.6     Recent Labs      18   ALTSGPT  50   ASTSGOT  80*   ALKPHOSPHAT  228*   TBILIRUBIN  16.0*   LIPASE  35   GLUCOSE  122*                 Lab Results   Component Value Date    TROPONINI 0.01  06/02/2018     Urinalysis:    Lab Results  Component Value Date/Time   SPECGRAVITY 1.025 06/02/2018 2204   GLUCOSEUR Negative 06/02/2018 2204   KETONES Negative 06/02/2018 2204   NITRITE Positive (A) 06/02/2018 2204   WBCURINE 2-5 (A) 06/02/2018 2204   RBCURINE 10-20 (A) 06/02/2018 2204   BACTERIA Negative 06/02/2018 2204   EPITHELCELL Negative 06/02/2018 2204        Imaging  CT-CHEST,ABDOMEN,PELVIS WITH   Final Result         1.  Extensive bilateral pulmonary masses, appearance compatible with diffuse metastatic disease.   2.  Numerous hyperdense regular masses along the gallbladder wall, could represent primary versus metastatic gallbladder neoplasia.   3.  Large irregular low-density hepatic mass in the right hepatic lobe, could represent primary versus metastatic neoplasia..   4.  Moderate intrahepatic biliary ductal dilatation is seen, compatible with biliary obstruction.   5.  Small filling defect at the bifurcation of the portal vein extending into right and left portal veins, appearance compatible with small thrombus. Could represent tumoral thrombus.   6.  Multiple mildly enlarged mediastinal lymph nodes, could represent metastatic disease.   7.  Fat-containing left inguinal hernia      US-GALLBLADDER   Final Result         1.  Gallbladder sludge and echogenic material along the gallbladder wall suggesting tumefactive sludge with borderline gallbladder wall thickening and trace pericholecystic fluid. Findings equivocal but raises concern for possible acalculous    cholecystitis.   2.  Hepatomegaly      DX-CHEST-PORTABLE (1 VIEW)   Final Result         1.  Extensive pulmonary nodules throughout the lungs, appearance most compatible with diffuse metastatic disease.      These findings were discussed with the patient's clinician, VIRGINIA RAMAN, on 6/2/2018 10:39 PM.           Assessment/Plan     I anticipate this patient will require at least two midnights for appropriate medical management, necessitating  inpatient admission.    Metastatic cancer (HCC)- (present on admission)   Assessment & Plan    Metastatic cancer of unknown origin with spread to the lungs, gallbladder and liver.  I discussed the severity of the cancer and the likely poor prognosis with the patient. Patient would like to remain a full code and discuss things further with oncology  I will consult palliative care  We will consult oncology in the morning for further recommendations        Cholecystitis, acute- (present on admission)   Assessment & Plan    Ultrasound findings concerning for acalculous cholecystitis in the setting metastatic cancer  Continue IV antibiotics and IV fluids  Pain control with oxycodone and IV morphine, monitor respiratory status closely  Surgery Dr Roth was consulted, no surgical intervention recommended at this time        Sepsis (HCC)- (present on admission)   Assessment & Plan    This is sepsis (without associated acute organ dysfunction).   Likely secondary to cholecystitis  Patient has been started on IV fluids per septic protocol  The patient has been started on IV Zosyn  Follow blood cultures  Surgery was consulted by the ER physician and did not recommend any urgent surgical intervention at the time        Elevated transaminase level- (present on admission)   Assessment & Plan    Secondary to metastatic cancer  Continue to monitor CMP        Elevated bilirubin- (present on admission)   Assessment & Plan    Likely secondary to obstruction from metastatic cancer  Continue to monitor CMP        Portal vein thrombosis   Assessment & Plan    Secondary to metastatic cancer  I will start the patient on anticoagulation with Lovenox 1 mg/kg BID  Further recommendations by hematology            VTE prophylaxis: Lovenox .

## 2018-06-04 NOTE — CARE PLAN
Problem: Communication  Goal: The ability to communicate needs accurately and effectively will improve  Outcome: PROGRESSING AS EXPECTED  Reviewed POC with patient and family at bedside, questions and concerns answered to patient satisfaction.     Problem: Pain Management  Goal: Pain level will decrease to patient's comfort goal  Outcome: PROGRESSING AS EXPECTED  Pt denies pain at present time. Will continue to monitor.

## 2018-06-04 NOTE — PROGRESS NOTES
IR RN note:    Site Marked and Confirmed with MD, patient and RN pre procedure   Liver BX by MD Blue assisted by CT Marques,Right mid abdomen access site;  4x cores in formalin     End Tidal CO2 range 26-33 during procedure   Pt to lay on right side for 1 hour with Q15 RN site checks then to lay on back for additional hour with site checks   Patient tolerated procedure, hemodynamically stable; pt awake and talking post procedure; report given to MARIAELENA Garay;   End sedation monitoring time at the time of signing this note  patient transported back to room via IR RN monitored then transferred care to report RN

## 2018-06-04 NOTE — PROGRESS NOTES
[facility] Hospitalist Progress Note    Date of Service: 2018    Chief Complaint  67 y.o. male admitted 2018 with metastasis pulmonary and liver lesions.    Interval Problem Update  Had liver biopsy done today. No acute events overnight.    Consultants/Specialty  none    Disposition  Remain on the floor        Review of Systems   Constitutional: Positive for weight loss. Negative for chills and fever.   HENT: Negative for congestion and nosebleeds.    Eyes: Negative for blurred vision, pain, discharge and redness.   Respiratory: Negative for cough, sputum production, shortness of breath and stridor.    Cardiovascular: Negative for chest pain, palpitations and orthopnea.   Gastrointestinal: Negative for abdominal pain, diarrhea, heartburn, nausea and vomiting.   Genitourinary: Negative for dysuria, frequency and urgency.   Musculoskeletal: Negative for back pain, myalgias and neck pain.   Skin: Negative for itching and rash.   Neurological: Positive for weakness. Negative for dizziness, focal weakness, seizures and headaches.   Psychiatric/Behavioral: Negative for depression. The patient is not nervous/anxious and does not have insomnia.       Physical Exam  Laboratory/Imaging   Hemodynamics  Temp (24hrs), Av.9 °C (98.4 °F), Min:36.7 °C (98 °F), Max:37.2 °C (98.9 °F)   Temperature: 36.9 °C (98.5 °F)  Pulse  Av.3  Min: 75  Max: 98    Blood Pressure : 118/78      Respiratory      Respiration: 18, Pulse Oximetry: 94 %        RUL Breath Sounds: Clear, RML Breath Sounds: Clear;Diminished, RLL Breath Sounds: Diminished, AKIKO Breath Sounds: Clear, LLL Breath Sounds: Diminished    Fluids  No intake or output data in the 24 hours ending 18 0759    Nutrition  Orders Placed This Encounter   Procedures   • DIET NPO     Standing Status:   Standing     Number of Occurrences:   8     Order Specific Question:   Restrict to:     Answer:   Ice Chips [2]     Physical Exam   Constitutional: He is oriented to person,  place, and time. No distress.   HENT:   Head: Normocephalic and atraumatic.   Mouth/Throat: Oropharynx is clear and moist.   Eyes: Conjunctivae and EOM are normal. Pupils are equal, round, and reactive to light.   Neck: Normal range of motion. Neck supple. No tracheal deviation present. No thyromegaly present.   Cardiovascular: Normal rate and regular rhythm.    No murmur heard.  Pulmonary/Chest: Effort normal and breath sounds normal. No respiratory distress. He has no wheezes.   Abdominal: Soft. Bowel sounds are normal. He exhibits no distension. There is no tenderness.   Musculoskeletal: He exhibits no edema or tenderness.   Neurological: He is alert and oriented to person, place, and time. No cranial nerve deficit.   Skin: Skin is warm and dry. He is not diaphoretic. No erythema.   Psychiatric: He has a normal mood and affect. His behavior is normal. Thought content normal.       Recent Labs      06/02/18   2218   WBC  13.1*   RBC  3.70*   HEMOGLOBIN  11.5*   HEMATOCRIT  34.3*   MCV  92.7   MCH  31.1   MCHC  33.5*   RDW  64.6*   PLATELETCT  363   MPV  9.3     Recent Labs      06/02/18   2218   SODIUM  128*   POTASSIUM  3.8   CHLORIDE  97   CO2  21   GLUCOSE  122*   BUN  15   CREATININE  0.87   CALCIUM  8.6     Recent Labs      06/03/18   0937   APTT  39.9*   INR  1.27*                  Assessment/Plan     Metastatic cancer (HCC)- (present on admission)   Assessment & Plan    Liver biopsy today  Await pathology report  Already discussed with oncology about it, to inform them when biopsy result is available          Cholecystitis, acute- (present on admission)   Assessment & Plan    Ultrasound findings concerning for acalculous cholecystitis in the setting metastatic cancer  Continue IV antibiotics and IV fluids  Pain control with oxycodone and IV morphine, monitor respiratory status closely  Surgery Dr Roth was consulted, no surgical intervention recommended at this time          Sepsis (HCC)- (present on  admission)   Assessment & Plan    This is sepsis (without associated acute organ dysfunction).   Likely secondary to cholecystitis?  on IV Zosyn  Follow blood cultures  Surgery was consulted by the ER physician and did not recommend any urgent surgical intervention at the time        Elevated transaminase level- (present on admission)   Assessment & Plan    Along with hyperbilirubinemia  Secondary to metastatic cancer  Continue to monitor CMP        Elevated bilirubin- (present on admission)   Assessment & Plan    Likely secondary to obstruction from metastatic cancer  Continue to monitor CMP        Portal vein thrombosis- (present on admission)   Assessment & Plan    Secondary to metastatic cancer  On lovenox   Further recommendations by hematology          Quality-Core Measures   Reviewed items::  Labs reviewed, Medications reviewed and Radiology images reviewed  DVT prophylaxis pharmacological::  Enoxaparin (Lovenox)

## 2018-06-04 NOTE — PROGRESS NOTES
Bedside report received from noc RN, assumed pt care, assessment completed, pt aox4, pt ambulates steady gait up SBA calls appropriately for assistance, pt currently NPO for procedure today, unknown procedure time at this time, family present at bedside, questions and concerns answered to patient satisfaction, PIV IVF, bed in low position, call light and personal belongings within reach, hourly rounding in place, pt needs met.

## 2018-06-04 NOTE — PROGRESS NOTES
Dressing assessed at this time CDI, no evidence of bleeding, pt medicated per MAR for c/o pain 6/10, will continue to monitor.

## 2018-06-04 NOTE — CARE PLAN
Problem: Communication  Goal: The ability to communicate needs accurately and effectively will improve  Outcome: PROGRESSING AS EXPECTED  Patient and significant other/support system have been informed on the plan of care for the shift, all questions answered.     Problem: Safety  Goal: Will remain free from injury  Outcome: PROGRESSING AS EXPECTED  Pt assessed for fall risks. Educated on use of call light, encouraged pt to call for assistance.

## 2018-06-04 NOTE — PROGRESS NOTES
Received report from day shift RN, assumed care of patient at 1900. AOx4. Stand by assist. Complaining of mild pain, denies the need for medication. Denies nausea at this time. PIV w/ fluids infusing. Tele monitored. Wife at bedside. Call light within reach, bed in low and locked position. No other needs at this time.

## 2018-06-04 NOTE — OR SURGEON
Immediate Post- Operative Note        PostOp Diagnosis: LIVER MASS    Procedure(s): CT LIVER BX    Estimated Blood Loss: Less than 5 ml        Complications: None            6/4/2018     9:57 AM     Jacobo Blue

## 2018-06-04 NOTE — PROGRESS NOTES
Pt back from procedure, VSS, dressing to right abd CDI no drainage noted, pt sedated bed frame alarm turned on, resumed diet, family updated on plan of care, initiated 2 hr bed rest for patient at this time.

## 2018-06-05 PROBLEM — N39.0 UTI (URINARY TRACT INFECTION): Status: ACTIVE | Noted: 2018-01-01

## 2018-06-05 NOTE — PROGRESS NOTES
Pt A&Ox4. VS: /80   Pulse 76   Temp 36.3 °C (97.4 °F)   Resp 20   Ht 1.829 m (6')   Wt 93.7 kg (206 lb 9.1 oz)   SpO2 93%   BMI 28.02 kg/m² . Pt denies n/v, numbness, tingling, SOB and chest pain. Pt states pain in his right flank, but decline pain interventions. Pt needs met at this time, call light within reach, hourly rounding in effect, and will continue to monitor.

## 2018-06-05 NOTE — DOCUMENTATION QUERY
DOCUMENTATION QUERY    PROVIDERS: Please select “Cosign w/ note”to reply to query.    Dr. Lopez,    To better represent the severity of illness of your patient, please review the following information and exercise your independent professional judgment in responding to this query.     Sodium 128 / 132 / 131 is noted in the Lab Results . Based upon the clinical findings, risk factors, and treatment, can a diagnosis be provided to support this finding?     • Hyponatremia  • Findings of no clinical significance   • Other explanation of clinical findings  • Unable to determine (no explanation for clinical findings)    The medical record reflects the following:   Clinical Findings  Sodium 128 / 132 / 131 noted in lab results   Treatment  IV Normal Saline at 100 ml/hr started 6/3/18   Risk Factors  Sepsis, Metastatic Cancer, Acute Cholecystitis, Multiple Co-Morbidities.    Location within medical record  Lab Results      Thank you,   Alexa Cerda RN  Clinical   [phone]

## 2018-06-05 NOTE — CARE PLAN
Problem: Communication  Goal: The ability to communicate needs accurately and effectively will improve  Outcome: PROGRESSING AS EXPECTED  Patient and significant other/support system have been informed on the plan of care for the shift, all questions answered.     Problem: Safety  Goal: Will remain free from injury  Outcome: PROGRESSING AS EXPECTED  Pt assessed for fall risks. Educated on use of call light, encouraged pt to call for assistance. Pt upself w/ steady gait.

## 2018-06-05 NOTE — CARE PLAN
Problem: Safety  Goal: Will remain free from injury  Hourly rounding in effect, pt instructed to call for assistance, bed locked and in lowest position. Bed alarm off, with Marcia as second RN. Pt calls appropriately and ambulates with a steady gait.       Problem: Knowledge Deficit  Goal: Knowledge of the prescribed therapeutic regimen will improve  Pt updated and educated on nursing interventions, medications and POC

## 2018-06-06 NOTE — PROGRESS NOTES
Assumed care from AM shift. AAOx4. Up self. PICC flushes with + blood return.  Denies pain and nausea. Awaiting results of biopsy, wanting to go home.  Call light within reach, bed in lowest, locked position.  No further needs at this time.  Will continue to monitor.

## 2018-06-06 NOTE — PROGRESS NOTES
[facility] Hospitalist Progress Note    Date of Service: 6/6/2018    Chief Complaint  67 y.o. male admitted 6/2/2018 with RUQ pain and findings on imaging consistent with metastatic cancer.    Interval Problem Update  6/5 Patient states he still has mild pain in the RUQ but it has improved.  Physical examination is not consistent with cholecystitis.  He has increasing bilirubin on CMP.  Pathology report pending, once resulted will consult oncology.  6/6 Patient states pain is improving and he is not needing medication to control it.  Discussed results of pathology showing adenocarcinoma and why GI and oncology were consulted.  Prior to discharge, GI recommends upper and lower endoscopies for complete workup for malignancy as patient has never had this intervention.  ERCP would likely not achieve the desired drop in bilirubin needed but IR stenting/rocio tube may help assist - d/w Dr Kendrick for procedure tomorrow.  Lovenox discontinued for portal vein thrombosis and SCDs to be placed for DVT prophylaxis.  NPO p mn ordered.    Consultants/Specialty  elton - geeta  Oncology - Rossi  GI - Sefick    Disposition  tbd        Review of Systems   Constitutional: Negative for chills and fever.   HENT: Negative for congestion.    Eyes: Negative for blurred vision and photophobia.   Respiratory: Negative for cough and shortness of breath.    Cardiovascular: Negative for chest pain, claudication and leg swelling.   Gastrointestinal: Positive for abdominal pain (mild). Negative for constipation, diarrhea, heartburn, nausea and vomiting.   Genitourinary: Negative for dysuria and hematuria.   Musculoskeletal: Negative for joint pain and myalgias.   Skin: Negative for itching and rash.   Neurological: Negative for dizziness, sensory change, speech change, weakness and headaches.   Psychiatric/Behavioral: Negative for depression. The patient is not nervous/anxious and does not have insomnia.       Physical Exam  Laboratory/Imaging    Hemodynamics  Temp (24hrs), Av °C (98.6 °F), Min:37 °C (98.6 °F), Max:37.1 °C (98.7 °F)   Temperature: 37.1 °C (98.7 °F)  Pulse  Av  Min: 68  Max: 98    Blood Pressure : 117/77      Respiratory      Respiration: 19, Pulse Oximetry: 92 %        RUL Breath Sounds: Clear, RML Breath Sounds: Clear, RLL Breath Sounds: Diminished, AKIKO Breath Sounds: Clear, LLL Breath Sounds: Diminished    Fluids    Intake/Output Summary (Last 24 hours) at 18 1442  Last data filed at 18 0500   Gross per 24 hour   Intake             2185 ml   Output                0 ml   Net             2185 ml       Nutrition  Orders Placed This Encounter   Procedures   • DIET ORDER     Standing Status:   Standing     Number of Occurrences:   1     Order Specific Question:   Diet:     Answer:   Regular [1]   • DIET NPO     Standing Status:   Standing     Number of Occurrences:   8     Order Specific Question:   Restrict to:     Answer:   Sips with Medications [3]     Physical Exam   Constitutional: He is oriented to person, place, and time. He appears well-developed and well-nourished. No distress.   HENT:   Head: Normocephalic and atraumatic.   Eyes: Conjunctivae are normal.   Neck: Neck supple. No JVD present.   Cardiovascular: Normal rate, regular rhythm and normal heart sounds.  Exam reveals no gallop and no friction rub.    No murmur heard.  Pulmonary/Chest: Effort normal and breath sounds normal. No respiratory distress. He has no wheezes. He exhibits no tenderness.   Abdominal: Soft. Bowel sounds are normal. He exhibits no distension and no mass. There is tenderness (mild).   Musculoskeletal: He exhibits no edema or tenderness.   Neurological: He is alert and oriented to person, place, and time. No cranial nerve deficit.   Skin: Skin is warm and dry. He is not diaphoretic. No erythema. No pallor.   Jaundice     Psychiatric: He has a normal mood and affect. His behavior is normal.   Nursing note and vitals reviewed.      Recent  Labs      06/05/18   0943  06/06/18   0041   WBC  10.6  10.2   RBC  3.53*  3.26*   HEMOGLOBIN  11.3*  10.3*   HEMATOCRIT  33.0*  30.6*   MCV  93.5  93.9   MCH  32.0  31.6   MCHC  34.2  33.7   RDW  68.5*  69.2*   PLATELETCT  345  336   MPV  9.2  9.3     Recent Labs      06/04/18   2359  06/05/18   0943  06/06/18   0041   SODIUM  132*  131*  132*   POTASSIUM  3.7  3.6  3.6   CHLORIDE  99  100  100   CO2  23  23  24   GLUCOSE  130*  156*  95   BUN  13  14  14   CREATININE  0.87  0.78  0.85   CALCIUM  8.7  8.5  8.1*                      Assessment/Plan     Metastatic cancer (HCC)- (present on admission)   Assessment & Plan    Liver biopsy 6/4  Pathology reports adenocarcinoma favoring upper GI origin  Oncology consulted  GI, Dr Jordan consulted for possible intervention with biliary dilation and intervention - little help offered with ERCP but does recommend IR intervention - D/w Dr Kendrick  NPO at mn and no AC with cholangiogram with stenting as able.  Lungs, liver, gallbladder mets          Cholecystitis, acute- (present on admission)   Assessment & Plan    Ultrasound findings concerning for acalculous cholecystitis in the setting metastatic cancer, exam not consistent with acute rocio.  Minimal pain  Surgery Dr Roth was consulted, no surgical intervention recommended at this time          Sepsis (HCC)- (present on admission)   Assessment & Plan    This is sepsis (without associated acute organ dysfunction).   UTI on IV Zosyn  Follow blood cultures          UTI (urinary tract infection)   Assessment & Plan    Nitrate positive, LE positive  Zosyn          Elevated transaminase level- (present on admission)   Assessment & Plan    Along with hyperbilirubinemia  Secondary to metastatic cancer  Continue to monitor CMP        Elevated bilirubin- (present on admission)   Assessment & Plan    Likely secondary to obstruction from metastatic cancer  Continue to monitor CMP        Portal vein thrombosis- (present on admission)    Assessment & Plan    Secondary to metastatic cancer  Hold lovenox secondary to intervention, will resume if appropriate by oncology.          Quality-Core Measures   Reviewed items::  Labs reviewed, Medications reviewed and Radiology images reviewed  Galvez catheter::  No Galvez  DVT prophylaxis - mechanical:  SCDs  Antibiotics:  Treating active infection/contamination beyond 24 hours perioperative coverage

## 2018-06-06 NOTE — DIETARY
Nutrition services: Day 3 of admit.  Eliceo Vyas is a 67 y.o. male with admitting DX of metastatic Ca. Further Dx per MD notes: lung, liver, gallbladder mets, cholecystitis, Sepsis, UTI.     Pt seen for poor PO/wt loss per admit screen.     Interviewed pt who reports decreased intake/appetite with wt loss over past two months, UBW is ~ 270#. Pt reports loss of muscle in arms. Discussed importance of adequate nutrition and tips for optimizing PO intake, pt does not want supplements but agreeable to high protein food options such as smoothies and milkshakes. Pt with no further questions/concerns.       Assessment:  Height: 182.9 cm (6')  Weight: 97.7 kg (215 lb 6.2 oz)- per stand up scale today.   Body mass index is 29.21 kg/m².   Diet/Intake: Regular. Per ADL, pt ate >50% of two out of three documented meals.     Evaluation:   1. Pt with 20% wt loss and poor intake for ~ 2 months with muscle loss, characteristics of severe malnutrition.       Recommendations/Plan:  1. Will add Smoothie with breakfast and milkshake with lunch and dinner.   2. Encourage PO intake.   3. Document intake of all PO  as % taken in ADL's to provide interdisciplinary communication across all shifts.   4. Monitor weight.  5. Nutrition rep will continue to see patient for ongoing meal and snack preferences.     RD to monitor for adequate PO intake.

## 2018-06-06 NOTE — PROGRESS NOTES
[facility] Hospitalist Progress Note    Date of Service: 2018    Chief Complaint  67 y.o. male admitted 2018 with RUQ pain and findings on imaging consistent with metastatic cancer.    Interval Problem Update  Patient states he still has mild pain in the RUQ but it has improved.  Physical examination is not consistent with cholecystitis.  He has increasing bilirubin on CMP.  Pathology report pending, once resulted will consult oncology.    Consultants/Specialty  elton - sx    Disposition  tbd        Review of Systems   Constitutional: Negative for chills and fever.   HENT: Negative for congestion.    Eyes: Negative for blurred vision and photophobia.   Respiratory: Negative for cough and shortness of breath.    Cardiovascular: Negative for chest pain, claudication and leg swelling.   Gastrointestinal: Positive for abdominal pain (mild). Negative for constipation, diarrhea, heartburn, nausea and vomiting.   Genitourinary: Negative for dysuria and hematuria.   Musculoskeletal: Negative for joint pain and myalgias.   Skin: Negative for itching and rash.   Neurological: Negative for dizziness, sensory change, speech change, weakness and headaches.   Psychiatric/Behavioral: Negative for depression. The patient is not nervous/anxious and does not have insomnia.       Physical Exam  Laboratory/Imaging   Hemodynamics  Temp (24hrs), Av.6 °C (97.8 °F), Min:36.2 °C (97.1 °F), Max:37 °C (98.6 °F)   Temperature: 37 °C (98.6 °F)  Pulse  Av.6  Min: 68  Max: 98    Blood Pressure : 123/76      Respiratory      Respiration: 20, Pulse Oximetry: 91 %        RUL Breath Sounds: Clear, RML Breath Sounds: Clear, RLL Breath Sounds: Fine Crackles, AKIKO Breath Sounds: Clear, LLL Breath Sounds: Clear    Fluids    Intake/Output Summary (Last 24 hours) at 18 1945  Last data filed at 18 1700   Gross per 24 hour   Intake             1330 ml   Output                0 ml   Net             1330 ml       Nutrition  Orders  Placed This Encounter   Procedures   • DIET ORDER     Standing Status:   Standing     Number of Occurrences:   1     Order Specific Question:   Diet:     Answer:   Regular [1]     Physical Exam   Constitutional: He is oriented to person, place, and time. He appears well-developed and well-nourished. No distress.   HENT:   Head: Normocephalic and atraumatic.   Eyes: Conjunctivae are normal. Scleral icterus is present.   Neck: Neck supple. No JVD present.   Cardiovascular: Normal rate, regular rhythm and normal heart sounds.  Exam reveals no gallop and no friction rub.    No murmur heard.  Pulmonary/Chest: Effort normal and breath sounds normal. No respiratory distress. He has no wheezes. He exhibits no tenderness.   Abdominal: Soft. Bowel sounds are normal. He exhibits no distension and no mass. There is tenderness (mild, negative Chacko's).   Musculoskeletal: He exhibits no edema or tenderness.   Neurological: He is alert and oriented to person, place, and time. No cranial nerve deficit.   Skin: Skin is warm and dry. He is not diaphoretic. No erythema. No pallor.   Jaundice     Psychiatric: He has a normal mood and affect. His behavior is normal.   Nursing note and vitals reviewed.      Recent Labs      06/02/18 2218 06/05/18   0943   WBC  13.1*  10.6   RBC  3.70*  3.53*   HEMOGLOBIN  11.5*  11.3*   HEMATOCRIT  34.3*  33.0*   MCV  92.7  93.5   MCH  31.1  32.0   MCHC  33.5*  34.2   RDW  64.6*  68.5*   PLATELETCT  363  345   MPV  9.3  9.2     Recent Labs      06/02/18   2218  06/04/18   2359  06/05/18   0943   SODIUM  128*  132*  131*   POTASSIUM  3.8  3.7  3.6   CHLORIDE  97  99  100   CO2  21  23  23   GLUCOSE  122*  130*  156*   BUN  15  13  14   CREATININE  0.87  0.87  0.78   CALCIUM  8.6  8.7  8.5     Recent Labs      06/03/18   0937   APTT  39.9*   INR  1.27*                  Assessment/Plan     Metastatic cancer (HCC)- (present on admission)   Assessment & Plan    Liver biopsy 6/4  Await pathology  report  Lungs, liver, gallbladder mets          Cholecystitis, acute- (present on admission)   Assessment & Plan    Ultrasound findings concerning for acalculous cholecystitis in the setting metastatic cancer  Continue IV antibiotics and IV fluids  Pain control with oxycodone and IV morphine, monitor respiratory status closely  Surgery Dr Roth was consulted, no surgical intervention recommended at this time          Sepsis (HCC)- (present on admission)   Assessment & Plan    This is sepsis (without associated acute organ dysfunction).   UTI on IV Zosyn  Follow blood cultures          UTI (urinary tract infection)   Assessment & Plan    Nitrate positive, LE positive          Elevated transaminase level- (present on admission)   Assessment & Plan    Along with hyperbilirubinemia  Secondary to metastatic cancer  Continue to monitor CMP        Elevated bilirubin- (present on admission)   Assessment & Plan    Likely secondary to obstruction from metastatic cancer  Continue to monitor CMP        Portal vein thrombosis- (present on admission)   Assessment & Plan    Secondary to metastatic cancer  On lovenox   Further recommendations by hematology          Quality-Core Measures   Reviewed items::  Labs reviewed, Medications reviewed and Radiology images reviewed  Galvez catheter::  No Galvez  DVT prophylaxis pharmacological::  Enoxaparin (Lovenox)  Antibiotics:  Treating active infection/contamination beyond 24 hours perioperative coverage

## 2018-06-06 NOTE — PROGRESS NOTES
Received report from day shift RN, assumed care of patient at 1900. AOx4. Upself. Complaining of 8/10 pain when moving, medicated per MAR. Denies nausea. PIV w/ fluids infusing. Wife at bedside.  Call light within reach, bed in low and locked position. No other needs at this time.

## 2018-06-06 NOTE — CONSULTS
Gastroenterology consult    Note Author: Eric Cam M.D.   Supervising GI consultant: Dr. Calvin Cortés MD    Name Eliceo Vyas     1950   Age/Sex 67 y.o. male   MRN [id number]   Code Status  full code       Chief Complaint: Abdominal pain for 2 weeks      HPI: 67-year-old gentleman with negative past medical history presented with right hypochondrial pain for the last 2 weeks before admission, rated about 5-6 in severity, and increased to significant level on last  which brought him to the hospital. The pain is aggravated by breathing and movement, and no relation to food, intermittent in nature. He noticed some abdominal fullness recently and endorses poor appetite, generalized weakness and significant weight loss for the last 2 months, he lost about 60 pounds. He reports also dark colored urine.  He denies any nausea, vomiting, or change in bowel habits, no melena or hematemesis. Denies any fever, chills or sweating.  He has no colonoscopy done in the past, no EGD. No history of peptic ulcer, no GERD.     Admitted to the hospital on , found to have high bilirubin up to 16.0, alkaline phosphatase is high up to 228, AST 80, ALT 50. CT abdomen  showed large mass in the right hepatic lobe, masses abdomen orbital wall, intrahepatic biliary duct dilatation compatible with biliary obstruction, bilateral pulmonary masses. Liver biopsy was done on ,  and pathology showed adenocarcinoma. IHC profile favors an upper GI primary.  GI consulted to assess him.      Review of Systems   Constitutional: Positive for malaise/fatigue and weight loss. Negative for chills, diaphoresis and fever.   HENT: Negative for hearing loss and tinnitus.    Eyes: Negative for blurred vision and double vision.   Respiratory: Negative for cough, hemoptysis, sputum production, shortness of breath and wheezing.    Cardiovascular: Negative for chest pain, palpitations, orthopnea and leg swelling.   Gastrointestinal:  Positive for abdominal pain. Negative for blood in stool, constipation, diarrhea, heartburn, melena, nausea and vomiting.   Genitourinary: Negative for dysuria, frequency and urgency.        Dark colored urine   Skin: Negative for itching and rash.   Neurological: Negative for dizziness and headaches.   Endo/Heme/Allergies: Does not bruise/bleed easily.   Psychiatric/Behavioral: Negative for depression.             Past Medical History:   History reviewed. No pertinent past medical history.    Past Surgical History:  History reviewed. No pertinent surgical history.    Current Outpatient Medications:  Home Medications     Reviewed by Daniella Basurto R.N. (Registered Nurse) on 06/03/18 at 0516  Med List Status: Complete   Medication Last Dose Status        Patient Clifton Taking any Medications                       Medication Allergy/Sensitivities:  No Known Allergies      Family History:  History reviewed. No pertinent family history.     No family history of gastrointestinal cancer    Social History:  Social History     Social History   • Marital status: N/A     Spouse name: N/A   • Number of children: N/A   • Years of education: N/A     Occupational History   • Not on file.     Social History Main Topics   • Smoking status: Never Smoker   • Smokeless tobacco: Never Used   • Alcohol use Not on file   • Drug use: Unknown   • Sexual activity: Not on file     Other Topics Concern   • Not on file     Social History Narrative   • No narrative on file     Remote history of smoking, quit 50 years ago, no alcohol ingestion, no illicit drug use.      Physical Exam     Vitals:    06/05/18 1600 06/05/18 2000 06/06/18 0400 06/06/18 0726   BP: 123/76 131/85 125/76 117/77   Pulse: 93 77 74 71   Resp: 20 20 18 19   Temp: 37 °C (98.6 °F) 37 °C (98.6 °F) 37 °C (98.6 °F) 37.1 °C (98.7 °F)   SpO2: 91% 94% 91% 92%   Weight:       Height:         Body mass index is 28.79 kg/m².  /77   Pulse 71   Temp 37.1 °C (98.7 °F)    Resp 19   Ht 1.829 m (6')   Wt 96.3 kg (212 lb 4.9 oz)   SpO2 92%   BMI 28.79 kg/m²   O2 therapy: Pulse Oximetry: 92 %, O2 (LPM): 0, O2 Delivery: None (Room Air)    Physical Exam   Constitutional: He is oriented to person, place, and time and well-developed, well-nourished, and in no distress. No distress.   HENT:   Head: Normocephalic and atraumatic.   Eyes: Conjunctivae and EOM are normal. Pupils are equal, round, and reactive to light. Scleral icterus is present.   Neck: Normal range of motion. Neck supple. No JVD present. No tracheal deviation present.   Cardiovascular: Normal rate, regular rhythm and intact distal pulses.  Exam reveals no gallop and no friction rub.    No murmur heard.  Pulmonary/Chest: Effort normal and breath sounds normal. No respiratory distress. He has no wheezes. He has no rales. He exhibits no tenderness.   Abdominal: Soft. Bowel sounds are normal. He exhibits distension and mass. There is tenderness. There is no rebound and no guarding.   Tenderness at the right hypochondrial area, palpable liver edge or mass   Musculoskeletal: He exhibits no edema.   Lymphadenopathy:     He has no cervical adenopathy.   Neurological: He is alert and oriented to person, place, and time.   Skin: He is not diaphoretic.   Psychiatric: Affect and judgment normal.             Data Review       Old Records Request:   Completed  Current Records review/summary: Completed    Lab Data Review:  Recent Results (from the past 24 hour(s))   COMP METABOLIC PANEL    Collection Time: 06/06/18 12:41 AM   Result Value Ref Range    Sodium 132 (L) 135 - 145 mmol/L    Potassium 3.6 3.6 - 5.5 mmol/L    Chloride 100 96 - 112 mmol/L    Co2 24 20 - 33 mmol/L    Anion Gap 8.0 0.0 - 11.9    Glucose 95 65 - 99 mg/dL    Bun 14 8 - 22 mg/dL    Creatinine 0.85 0.50 - 1.40 mg/dL    Calcium 8.1 (L) 8.5 - 10.5 mg/dL    AST(SGOT) 68 (H) 12 - 45 U/L    ALT(SGPT) 37 2 - 50 U/L    Alkaline Phosphatase 183 (H) 30 - 99 U/L    Total  Bilirubin 16.2 (H) 0.1 - 1.5 mg/dL    Albumin 2.4 (L) 3.2 - 4.9 g/dL    Total Protein 7.0 6.0 - 8.2 g/dL    Globulin 4.6 (H) 1.9 - 3.5 g/dL    A-G Ratio 0.5 g/dL   CBC WITH DIFFERENTIAL    Collection Time: 06/06/18 12:41 AM   Result Value Ref Range    WBC 10.2 4.8 - 10.8 K/uL    RBC 3.26 (L) 4.70 - 6.10 M/uL    Hemoglobin 10.3 (L) 14.0 - 18.0 g/dL    Hematocrit 30.6 (L) 42.0 - 52.0 %    MCV 93.9 81.4 - 97.8 fL    MCH 31.6 27.0 - 33.0 pg    MCHC 33.7 33.7 - 35.3 g/dL    RDW 69.2 (H) 35.9 - 50.0 fL    Platelet Count 336 164 - 446 K/uL    MPV 9.3 9.0 - 12.9 fL    Neutrophils-Polys 73.50 (H) 44.00 - 72.00 %    Lymphocytes 12.90 (L) 22.00 - 41.00 %    Monocytes 11.20 0.00 - 13.40 %    Eosinophils 0.90 0.00 - 6.90 %    Basophils 0.20 0.00 - 1.80 %    Immature Granulocytes 1.30 (H) 0.00 - 0.90 %    Nucleated RBC 0.00 /100 WBC    Lymphs (Absolute) 1.31 1.00 - 4.80 K/uL    Monos (Absolute) 1.14 (H) 0.00 - 0.85 K/uL    Eos (Absolute) 0.09 0.00 - 0.51 K/uL    Baso (Absolute) 0.02 0.00 - 0.12 K/uL    Immature Granulocytes (abs) 0.13 (H) 0.00 - 0.11 K/uL    NRBC (Absolute) 0.00 K/uL    Neutrophils (Absolute) 7.50 (H) 1.82 - 7.42 K/uL   PERIPHERAL SMEAR REVIEW    Collection Time: 06/06/18 12:41 AM   Result Value Ref Range    Peripheral Smear Review see below    ESTIMATED GFR    Collection Time: 06/06/18 12:41 AM   Result Value Ref Range    GFR If African American >60 >60 mL/min/1.73 m 2    GFR If Non African American >60 >60 mL/min/1.73 m 2       Imaging/Procedures Review:    Independant Imaging Review: Completed  CT-NEEDLE BX-LIVER   Final Result      Successful CT-guided core biopsy of right hepatic mass.      CT-CHEST,ABDOMEN,PELVIS WITH   Final Result         1.  Extensive bilateral pulmonary masses, appearance compatible with diffuse metastatic disease.   2.  Numerous hyperdense regular masses along the gallbladder wall, could represent primary versus metastatic gallbladder neoplasia.   3.  Large irregular low-density  hepatic mass in the right hepatic lobe, could represent primary versus metastatic neoplasia..   4.  Moderate intrahepatic biliary ductal dilatation is seen, compatible with biliary obstruction.   5.  Small filling defect at the bifurcation of the portal vein extending into right and left portal veins, appearance compatible with small thrombus. Could represent tumoral thrombus.   6.  Multiple mildly enlarged mediastinal lymph nodes, could represent metastatic disease.   7.  Fat-containing left inguinal hernia      US-GALLBLADDER   Final Result         1.  Gallbladder sludge and echogenic material along the gallbladder wall suggesting tumefactive sludge with borderline gallbladder wall thickening and trace pericholecystic fluid. Findings equivocal but raises concern for possible acalculous    cholecystitis.   2.  Hepatomegaly      DX-CHEST-PORTABLE (1 VIEW)   Final Result         1.  Extensive pulmonary nodules throughout the lungs, appearance most compatible with diffuse metastatic disease.      These findings were discussed with the patient's clinician, VIRGINIA RAMAN, on 6/2/2018 10:39 PM.                  Assessment/Plan     #  Metastatic Adeno carcinoma of unknown origin    60 Y old male with negative past medical hx presented with masses in liver, gall bladder area, extensive bilateral pulmonary masses, enlarged mediastinal lymph nodes and Moderate intrahepatic biliary duct dilatation compatible with biliary obstruction. Possible small thrombus in portal vein.  Liver biopsy showed adenocarcinoma, IHC profile favors an upper GI primary.  He is jaundiced with bilirubin up to 16.2, Alk ph 183  This is adeno carcinoma of unknown origin, most likely from gastrointestinal tract.    The patient definitely needs stenting for his biliary tract. Unfortunately, the anatomy of his biliary obstruction, being more intrahepatic obstruction, is not fit for stenting though ERCP procedure. We suggest stenting of the biliary  tract with interventional radiology guidance. Dr. Cortés spoke with interventional radiologist and plan for procedure on tomorrow.      Plan :    - Needs biliary stenting under IR guidance. Plan for tomorrow  - NPO by midnight  - hold anticoagulant, enoxaparin before the procedure  - will plan for EGD and colonoscopy to look for the primary source of tumor probably during this hospitalization, after finishing with the biliary stenting, as this takes the priority at the current time.  - Needs Oncology consult       # Biliary duct obstruction    Due to adenocarcinoma. Plan for stenting    # poral vein thrombosis    on enoxaparin. Needs to stop for the procedure.           Quality Measures  Quality-Core Measures   Reviewed items::  Labs reviewed, Medications reviewed and Radiology images reviewed  DVT prophylaxis pharmacological::  Enoxaparin (Lovenox)

## 2018-06-07 NOTE — PROGRESS NOTES
"6/7/18 Per Dr. Hayes note-\"Biliary Drain completed, can be converted to internal external stent in 3 weeks\".   "

## 2018-06-07 NOTE — PROGRESS NOTES
Assumed care from AM shift. AAOx4. Up self. PIV flushes.  Denies pain and nausea. Awaiting IR procedure.  Patient NPO currently.  Call light within reach, bed in locked and lowest position.  No further needs at this time.  Will continue to monitor.

## 2018-06-07 NOTE — OR SURGEON
Immediate Post- Operative Note        PostOp Diagnosis: BILATERAL biliary obstruction from central liver mass      Procedure(s): LEFT internal-external biliary drain  RIGHT external biliary drain      Estimated Blood Loss: Less than 5 ml        Complications: None            6/7/2018     1:46 PM     Amrik Hayes

## 2018-06-07 NOTE — PROGRESS NOTES
2nd page to [facility] - [facility].  Awaiting orders for platelets for IR procedure.  Blood bank aware of pending order.

## 2018-06-07 NOTE — CONSULTS
DATE OF SERVICE:  06/06/2018    REASON FOR CONSULTATION:  Suspected adenocarcinoma, upper GI source.    HISTORY OF PRESENT ILLNESS:  A very nice 67-year-old gentleman in [facility] who seemed as though he has not felt well for the last 2 months.  He   has had some unintentional weight loss.  He has had some generalized weakness   and pain.  He came to the emergency room for further workup.  He was also   jaundiced.  He had further workup with imaging.  He had CT scans done on   06/03, which showed extensive bilateral pulmonary masses throughout his lungs.    Diffuse disease.  Numerous irregular mass along the gallbladder and also low   density mass in the hepatic region in the right hepatic lobe.  There is some   ductal dilatation as well.  He also was found to have some mild thrombus in   the portal veins and they felt it could be just tumor associated, also   multiple enlarged lymph nodes in the mediastinum.  He underwent biopsy on   06/04/2018 by radiology of the liver.  It came back adenocarcinoma with some   fibrosis, but the profile they felt to be more consistent with upper GI   source.  It was negative for PSA staining and TTF-1 and GATA3, argue against   that as well as lung ____ bladder.  He is positive for CK7, CK20, and CDX2,   favoring upper GI.  He unfortunately has a bilirubin of over 16, mostly liver   functions themselves are minimally up.    PAST MEDICAL HISTORY:  Really no significant past medical history.    PAST SURGICAL HISTORY:  No significant past surgical history.    FAMILY HISTORY:  None significant, he did have a large family.    SOCIAL HISTORY:  Nondrinker.  Quit smoking about 30 years ago.    PHYSICAL EXAMINATION:  GENERAL:  He is alert and oriented x3.  He is clearly jaundiced.  He is in no   acute distress.  LUNGS:  Clear bilaterally.  CARDIOVASCULAR:  Regular rate and rhythm.  Normal S1 and S2.  ABDOMEN:  Soft and nontender.  EXTREMITIES:  No clubbing, cyanosis, or  edema.  NEUROLOGIC:  Intact.  No organomegaly or masses.  LUNGS:  Clear.  No other focal findings.    IMPRESSION AND PLAN:  A 67-year-old [place] gentleman.  ECOG performance status   of 1 with what appears to be an adenocarcinoma so far of upper GI origin.    Gastroenterology has seen the patient.  They are planning an upper and lower   endoscopy to be complete and they suggested that radiology attempt possible   drain to see if we can bring his bilirubin down.    I talked with him and his wife and we discussed that unfortunately this looks   like stage IV disease no matter what we end up saying the origin could be.    They understand that in these cases that treatment a lot of times is   palliative.  They understand that treatment could include potentially   chemotherapy as well as even immunotherapy or others if we have targets that   we can use.  I told them we will see over the next several days what happens   with the possibility of trying to put a drain in, so we can get his bilirubin   down.  They understand that we need to have healthier liver with bilirubin for   consideration of palliative treatments.  We will wait and see what radiology   can do.    I think in regards to the minimal small thrombus in the portal vein they feel   at this point is tumor associated, so I think I would hold off on any   anticoagulation at this time unless we start seeing significant change in his   laboratories.    Extensive discussion was held with him and his family, review of records,   imaging and discussion with the primary service, moderate to high complex.       ____________________________________     MD MARY ELLEN Lai / JOSUE    DD:  06/06/2018 23:08:20  DT:  06/06/2018 23:48:08    D#:  [id number]  Job#:  [id number]    cc: Calvin Garcia DO, DO

## 2018-06-07 NOTE — PROGRESS NOTES
Received report from day shift RN, assumed care of patient at 1900. AOx4. Upself. Complaining of 8/10 pain when moving, medicated per MAR. Denies nausea. PIV w/ IV-ABX& fluids infusing. Wife at bedside.  Call light within reach, bed in low and locked position. No other needs at this time.

## 2018-06-07 NOTE — PROGRESS NOTES
Per Dr. Kendrick-Biliary Drain Placement procedure for 6/7/18. MARIAELENA Cuello made aware, she will have patient NPO midnight and coag protocol given. [email or web address]

## 2018-06-07 NOTE — PROGRESS NOTES
Gastroenterology Progress Note      Note Author: Eric Cam M.D.   Supervising GI consultant: Dr. Calvin Cortés MD     Name Eliceo Vyas     1950   Age/Sex 67 y.o. 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 Negative for chills, diaphoresis and fever.   HENT: Negative for hearing loss and tinnitus.    Eyes: Negative for blurred vision and double vision.   Respiratory: Negative for cough, hemoptysis, sputum production, shortness of breath and wheezing.    Cardiovascular: Negative for chest pain, palpitations, orthopnea and leg swelling.   Gastrointestinal: Positive for abdominal pain but it is improving. Negative for blood in stool, constipation, diarrhea, heartburn, melena, nausea and vomiting.   Genitourinary: Negative for dysuria, frequency and urgency.        Dark colored urine   Skin: Negative for itching and rash.   Neurological: Negative for dizziness and headaches.   Endo/Heme/Allergies: Does not bruise/bleed easily.   Psychiatric/Behavioral: Negative for depression.       Quality-Core Measures   Reviewed items::  Labs reviewed, Medications reviewed and Radiology images reviewed  DVT prophylaxis: SCD        Physical Exam       Vitals:    18 0500 18 0900 18 1408 18 1415   BP: 117/73 122/69 118/72 114/71   Pulse: 74 70 66 68   Resp: 20 18 (!) 25 (!) 22   Temp: 36.8 °C (98.3 °F) 37 °C (98.6 °F) 36.5 °C (97.7 °F)    SpO2: 92% 93% 99% 95%   Weight:       Height:         Body mass index is 29.21 kg/m².    Oxygen Therapy:  Pulse Oximetry: 95 %, O2 (LPM): 6, O2 Delivery:  Nasal Cannula    Physical Exam    Constitutional: He is oriented to person, place, and time and well-developed, well-nourished, and in no distress. No distress.   HENT:   Head: Normocephalic and atraumatic.   Eyes: Conjunctivae and EOM are normal. Pupils are equal, round, and reactive to light. Scleral icterus is present.   Neck: Normal range of motion. Neck supple. No JVD present. No tracheal deviation present.   Cardiovascular: Normal rate, regular rhythm and intact distal pulses.  Exam reveals no gallop and no friction rub.    No murmur heard.  Pulmonary/Chest: Effort normal and breath sounds normal. No respiratory distress. He has no wheezes. He has no rales. He exhibits no tenderness.   Abdominal: Soft. Bowel sounds are normal. He exhibits distension and mass. There is tenderness. There is no rebound and no guarding.   Tenderness at the right hypochondrial area, palpable liver edge or mass   Musculoskeletal: He exhibits no edema.   Lymphadenopathy:     He has no cervical adenopathy.   Neurological: He is alert and oriented to person, place, and time.   Skin: He is not diaphoretic.   Psychiatric: Affect and judgment normal.     Lab Data Review:         6/7/2018  2:35 PM    Recent Labs      06/05/18   0943  06/06/18   0041  06/07/18   0853   SODIUM  131*  132*  131*   POTASSIUM  3.6  3.6  3.5*   CHLORIDE  100  100  99   CO2  23  24  23   BUN  14  14  12   CREATININE  0.78  0.85  0.73   CALCIUM  8.5  8.1*  8.1*       Recent Labs      06/05/18   0943  06/06/18   0041  06/07/18   0853   ALTSGPT  43  37  38   ASTSGOT  79*  68*  82*   ALKPHOSPHAT  204*  183*  198*   TBILIRUBIN  17.7*  16.2*  17.7*   GLUCOSE  156*  95  103*       Recent Labs      06/05/18   0943  06/06/18   0041  06/07/18   0853  06/07/18   0854   RBC  3.53*  3.26*  3.35*   --    HEMOGLOBIN  11.3*  10.3*  10.6*   --    HEMATOCRIT  33.0*  30.6*  31.7*   --    PLATELETCT  345  336  348   --    PROTHROMBTM   --    --    --   16.0*   INR   --    --    --    1.31*       Recent Labs      06/05/18   0943  06/06/18   0041  06/07/18   0853   WBC  10.6  10.2  10.4   NEUTSPOLYS  79.50*  73.50*  78.10*   LYMPHOCYTES  9.00*  12.90*  9.90*   MONOCYTES  9.80  11.20  10.10   EOSINOPHILS  0.60  0.90  0.40   BASOPHILS  0.30  0.20  0.50   ASTSGOT  79*  68*  82*   ALTSGPT  43  37  38   ALKPHOSPHAT  204*  183*  198*   TBILIRUBIN  17.7*  16.2*  17.7*           Assessment/Plan       #  Metastatic Adeno carcinoma of unknown origin      Most likely upper GI source. Bilirubin is high, 17.7. Plan for biliary drainage by IR today, since the anatomy is not suitable for ERCP.  Will hold EGD and colonoscopy at the current time, will defer this decision until we see response from the biliary drainage, we hope bilirubin to come down. If the bilirubin decreases, will plan for EGD and colonoscopy.       # Biliary duct obstruction    Due to adenocarcinoma. Plan for external/ internal biliary drainage by IR today.   Also we do not see strong indication for antibiotic, since there is no features of cholangitis or cholecystitis.      # poral vein thrombosis     Hold anticoagulation at the current time.

## 2018-06-07 NOTE — PROGRESS NOTES
IR Procedure Note:    Site Marked and Confirmed with MD, patient and RN pre procedure. Dr. Hayes performed a biliary drain placement.  The pt tolerated the procedure well; Anesthesia provided by Dr. Pineda.  Sutures, gauze and tape applied to both drains, CDI and soft.  Pt alert and verbally appropriate post procedure, vital signs stable during procedure and transport, see flow sheet for vital signs.  Report given to Rosa Maria FERRELL.  RN transported pt to [facility] with Sao2 monitor.      Procedure End Time: 1342    Sedation Monitoring End Time:  1408    [employer] Flexima RO Regular. 40Tn17og. REF: [id number].  LOT: [id number]    [employer] Flex[employer] APDL. 18Fx60kj. REF: [id number].  LOT: [id number]

## 2018-06-07 NOTE — CARE PLAN
Problem: Communication  Goal: The ability to communicate needs accurately and effectively will improve    Intervention: Educate patient and significant other/support system about the plan of care, procedures, treatments, medications and allow for questions  Plan of Care discussed with patient and wife, consent for biliary drain placement provided and discussed per protocol.      Problem: Pain Management  Goal: Pain level will decrease to patient's comfort goal    Intervention: Follow pain managment plan developed in collaboration with patient and Interdisciplinary Team  Patient medicated for pain per MAR.

## 2018-06-08 NOTE — PROGRESS NOTES
Assumed care from AM shift. AAOx4. Up self. PIV flushes. Pain continues.  Advised hospitalist.  New orders were obtained. Biliary tubes draining brown liquid.  Dressings are CDI.  Family at bedside.  No further needs at this time.  Will continue to monitor.

## 2018-06-08 NOTE — PROGRESS NOTES
Received report from day shift RN, assumed care of patient at 1900. Patient is AOx4 and upself.Patient complaining of 8/10 pain, medicated per MAR. Denies nausea. Patient had B/L-biliary drain placement today, bandages are CD&I, with minimal output to drainage devices. PIV w/ IV-ABX& fluids infusing. Wife at bedside.  Call light within reach, bed in low and locked position. No other needs at this time.

## 2018-06-08 NOTE — PROGRESS NOTES
Gastroenterology Progress Note      Note Author: Eric Cam M.D.   Supervising GI consultant: Christina Gonzalez MD    Name Eliceo Vyas     1950   Age/Sex 67 y.o. male   MRN [id number]         Reason for interval visit  (Principal Problem)   <principal problem not specified>   Metastatic adenocarcinoma of unknown origin, most likely upper GI source     Interval Problem Daily Status Update  (24 hours)     Biliary drainage by external catheters was done yesterday under interventional radiology with no complications  His bilirubin 16, trended down but still high  He has some pain at the right hypochondrial area    ROS     Constitutional: Positive for malaise/fatigue and weight loss. Negative for chills, diaphoresis and fever.   HENT: Negative for hearing loss and tinnitus.    Eyes: Negative for blurred vision and double vision.   Respiratory: Negative for cough, hemoptysis, sputum production, shortness of breath and wheezing.    Cardiovascular: Negative for chest pain, palpitations, orthopnea and leg swelling.   Gastrointestinal: Positive for abdominal pain. Negative for blood in stool, constipation, diarrhea, heartburn, melena, nausea and vomiting.   Genitourinary: Negative for dysuria, frequency and urgency.        Dark colored urine   Skin: Negative for itching and rash.   Neurological: Negative for dizziness and headaches.   Endo/Heme/Allergies: Does not bruise/bleed easily.   Psychiatric/Behavioral: Negative for depression.       Quality-Core Measures   Reviewed items::  Labs reviewed, Medications reviewed and Radiology images reviewed  DVT prophylaxis: SCD        Physical Exam       Vitals:    18 0800 18 1000 18 1023 18 1308   BP: 100/66      Pulse: 72      Resp: 18      Temp: 37.2 °C (98.9 °F)      SpO2: 91% (!) 85% 92%    Weight:    98.2 kg (216 lb 7.9 oz)   Height:    1.829 m (6')     Body mass index is 29.36 kg/m². Weight:  98.2 kg (216 lb 7.9 oz)  Oxygen Therapy:  Pulse Oximetry: 92 %, O2 (LPM): 2, O2 Delivery: Nasal Cannula    Physical Exam    Constitutional: He is oriented to person, place, and time and well-developed, well-nourished, and in no distress. No distress.   HENT:   Head: Normocephalic and atraumatic.   Eyes: Conjunctivae and EOM are normal. Pupils are equal, round, and reactive to light. Scleral icterus is present.   Neck: Normal range of motion. Neck supple. No JVD present. No tracheal deviation present.   Cardiovascular: Normal rate, regular rhythm and intact distal pulses.  Exam reveals no gallop and no friction rub.    No murmur heard.  Pulmonary/Chest: Effort normal and breath sounds normal. No respiratory distress. He has no wheezes. He has no rales. He exhibits no tenderness.   Abdominal: Soft. Bowel sounds are normal. He exhibits distension and mass. There is tenderness. There is no rebound and no guarding.   Tenderness at the right hypochondrial area, palpable liver edge or mass.  2 drains placed   Musculoskeletal: He exhibits no edema.   Lymphadenopathy:     He has no cervical adenopathy.   Neurological: He is alert and oriented to person, place, and time.   Skin: He is not diaphoretic.   Psychiatric: Affect and judgment normal.     Lab Data Review:         6/7/2018  2:35 PM    Recent Labs      06/06/18   0041  06/07/18   0853  06/08/18   0040   SODIUM  132*  131*  131*   POTASSIUM  3.6  3.5*  4.2   CHLORIDE  100  99  100   CO2  24  23  25   BUN  14  12  11   CREATININE  0.85  0.73  0.77   CALCIUM  8.1*  8.1*  8.2*       Recent Labs      06/06/18   0041  06/07/18   0853  06/08/18   0040   ALTSGPT  37  38  34   ASTSGOT  68*  82*  87*   ALKPHOSPHAT  183*  198*  184*   TBILIRUBIN  16.2*  17.7*  16.0*   GLUCOSE  95  103*  97       Recent Labs      06/06/18   0041  06/07/18   0853  06/07/18   0854  06/08/18   0302   RBC  3.26*  3.35*   --   3.13*   HEMOGLOBIN  10.3*  10.6*   --   10.0*   HEMATOCRIT  30.6*  31.7*    --   30.2*   PLATELETCT  336  348   --   331   PROTHROMBTM   --    --   16.0*   --    INR   --    --   1.31*   --        Recent Labs      06/06/18   0041  06/07/18   0853  06/08/18   0040  06/08/18   0302   WBC  10.2  10.4   --   10.7   NEUTSPOLYS  73.50*  78.10*   --   82.60*   LYMPHOCYTES  12.90*  9.90*   --   8.70*   MONOCYTES  11.20  10.10   --   7.20   EOSINOPHILS  0.90  0.40   --   0.40   BASOPHILS  0.20  0.50   --   0.40   ASTSGOT  68*  82*  87*   --    ALTSGPT  37  38  34   --    ALKPHOSPHAT  183*  198*  184*   --    TBILIRUBIN  16.2*  17.7*  16.0*   --            Assessment/Plan       #  Metastatic Adeno carcinoma of unknown origin      Most likely upper GI source.  Biliary drainage was done yesterday under interventional radiology with no complications.  Bilirubin is 16, trended down but still high  Will hold EGD and colonoscopy at the current time, will defer this decision until we see response from the biliary drainage, we hope bilirubin to come down. If the bilirubin decreases, will plan for EGD and colonoscopy.       # Biliary duct obstruction  Due to adenocarcinoma.  external drainage was done under interventional radiology   follow-up bilirubin      # poral vein thrombosis     Hold anticoagulation at the current time.      Will sign off the case.  If you have any questions, please do not hesitate to contact us.

## 2018-06-08 NOTE — DOCUMENTATION QUERY
"DOCUMENTATION QUERY    PROVIDERS: Please select “Cosign w/ note”to reply to query.    Dr. Lopez,    To better represent the severity of illness of your patient, please review the following information and exercise your independent professional judgment in responding to this query.     \"Pt with 20% wt loss & poor intake for ~ 2 months with muscle loss, characteristic of severe malnutrition.\"  is noted in the Dietary Note on 6/6/18. Based upon the clinical findings, risk factors, and treatment, can a diagnosis be provided to support this finding?    • Mild Protein Calorie Malnutrition  • Moderate Protein Calorie Malnutrition  • Severe Protein Calorie Malnutrition  • Cachexia  • Underweight  • Findings of no clinical significance  • Other explanation of clinical findings, please explain  • Unable to determine          The medical record reflects the following:   Clinical Findings Noted 20% wt loss & poor po intake for approx. 2 months which is characteristic of severe malnutrition, in the Dietary Note.    BMI 29.21   Treatment Dietary consult, smoothie & milkshakes added to meals, monitor po intake & wt   Risk Factors Metastatic cancer to liver, gallbladder, & lungs, possibly upper GI origin   Location within medical record Dietary Note       Thank you,   Anna Stokes RN, CCDS  Clinical   Phone # [phone]        "

## 2018-06-08 NOTE — CARE PLAN
Problem: Communication  Goal: The ability to communicate needs accurately and effectively will improve    Intervention: Use communication aids and/or /Language Line as appropriate  Patient's spouse aided in communication during treatment.       Problem: Pain Management  Goal: Pain level will decrease to patient's comfort goal    Intervention: Educate and implement non-pharmacologic comfort measures. Examples: relaxation, distration, play therapy, activity therapy, massage, etc.  Cold packs use to supplement medication for pain relief.

## 2018-06-08 NOTE — PROGRESS NOTES
[facility] Hospitalist Progress Note    Date of Service: 6/7/2018    Chief Complaint  67 y.o. male admitted 6/2/2018 with RUQ pain and findings on imaging consistent with metastatic cancer.    Interval Problem Update  6/5 Patient states he still has mild pain in the RUQ but it has improved.  Physical examination is not consistent with cholecystitis.  He has increasing bilirubin on CMP.  Pathology report pending, once resulted will consult oncology.  6/6 Patient states pain is improving and he is not needing medication to control it.  Discussed results of pathology showing adenocarcinoma and why GI and oncology were consulted.  Prior to discharge, GI recommends upper and lower endoscopies for complete workup for malignancy as patient has never had this intervention.  ERCP would likely not achieve the desired drop in bilirubin needed but IR stenting/rocio tube may help assist - d/w Dr Kendrick for procedure tomorrow.  Lovenox discontinued for portal vein thrombosis and SCDs to be placed for DVT prophylaxis.  NPO p mn ordered.  6/7 Patient without complaint this am, states mild RUQ pain.  He tolerated biliary drain placement without issue.  I spoke with Dr Hayes and given the significant tumor burden, required 2 drains to be placed and would not be appropriate for internalization at a later date, will have to remain as perc drains.  Will continue to monitor bilirubin to see if further chemotherapy interventions become appropriate.  Goal of bilirubin will be of less than 4.    Consultants/Specialty  elton connor  Oncology - [place]  GI - [facility]    Disposition  tbd        Review of Systems   Constitutional: Negative for chills and fever.   HENT: Negative for congestion.    Eyes: Negative for blurred vision and photophobia.   Respiratory: Negative for cough and shortness of breath.    Cardiovascular: Negative for chest pain, claudication and leg swelling.   Gastrointestinal: Positive for abdominal pain (mild). Negative for  constipation, diarrhea, heartburn, nausea and vomiting.   Genitourinary: Negative for dysuria and hematuria.   Musculoskeletal: Negative for joint pain and myalgias.   Skin: Negative for itching and rash.   Neurological: Negative for dizziness, sensory change, speech change, weakness and headaches.   Psychiatric/Behavioral: Negative for depression. The patient is not nervous/anxious and does not have insomnia.       Physical Exam  Laboratory/Imaging   Hemodynamics  Temp (24hrs), Av.8 °C (98.3 °F), Min:36.4 °C (97.5 °F), Max:37.4 °C (99.3 °F)   Temperature: 36.8 °C (98.2 °F)  Pulse  Av.2  Min: 59  Max: 98 Heart Rate (Monitored): 65  Blood Pressure : 119/75      Respiratory      Respiration: 20, Pulse Oximetry: 95 %        RUL Breath Sounds: Clear, RML Breath Sounds: Clear, RLL Breath Sounds: Diminished, AKIKO Breath Sounds: Clear, LLL Breath Sounds: Diminished    Fluids    Intake/Output Summary (Last 24 hours) at 18  Last data filed at 18 1400   Gross per 24 hour   Intake              600 ml   Output                0 ml   Net              600 ml       Nutrition  Orders Placed This Encounter   Procedures   • DIET ORDER     Standing Status:   Standing     Number of Occurrences:   1     Order Specific Question:   Diet:     Answer:   Regular [1]     Physical Exam   Constitutional: He is oriented to person, place, and time. He appears well-developed and well-nourished. No distress.   HENT:   Head: Normocephalic and atraumatic.   Eyes: Conjunctivae are normal.   Neck: Neck supple. No JVD present.   Cardiovascular: Normal rate, regular rhythm and normal heart sounds.  Exam reveals no gallop and no friction rub.    No murmur heard.  Pulmonary/Chest: Effort normal and breath sounds normal. No respiratory distress. He has no wheezes. He exhibits no tenderness.   Abdominal: Soft. Bowel sounds are normal. He exhibits no distension and no mass. There is tenderness (mild).   Musculoskeletal: He exhibits no  edema or tenderness.   Neurological: He is alert and oriented to person, place, and time. No cranial nerve deficit.   Skin: Skin is warm and dry. He is not diaphoretic. No erythema. No pallor.   Jaundice     Psychiatric: He has a normal mood and affect. His behavior is normal.   Nursing note and vitals reviewed.      Recent Labs      06/05/18   0943  06/06/18   0041  06/07/18   0853   WBC  10.6  10.2  10.4   RBC  3.53*  3.26*  3.35*   HEMOGLOBIN  11.3*  10.3*  10.6*   HEMATOCRIT  33.0*  30.6*  31.7*   MCV  93.5  93.9  94.6   MCH  32.0  31.6  31.6   MCHC  34.2  33.7  33.4*   RDW  68.5*  69.2*  70.9*   PLATELETCT  345  336  348   MPV  9.2  9.3  9.0     Recent Labs      06/05/18   0943  06/06/18   0041  06/07/18   0853   SODIUM  131*  132*  131*   POTASSIUM  3.6  3.6  3.5*   CHLORIDE  100  100  99   CO2  23  24  23   GLUCOSE  156*  95  103*   BUN  14  14  12   CREATININE  0.78  0.85  0.73   CALCIUM  8.5  8.1*  8.1*     Recent Labs      06/07/18   0854   INR  1.31*                  Assessment/Plan     Metastatic cancer (HCC)- (present on admission)   Assessment & Plan    Liver biopsy 6/4  Pathology reports adenocarcinoma favoring upper GI origin  Oncology consulted  GI, Dr Jordan consulted for possible intervention, upper and lower endoscopies pending reduction of bilirubin  2 percutaneous drains placed in IR, will continue to trend bilirubin.  Lungs, liver, gallbladder mets          Cholecystitis, acute- (present on admission)   Assessment & Plan    Ultrasound findings concerning for acalculous cholecystitis in the setting metastatic cancer, exam not consistent with acute rocio.  Minimal pain  Surgery Dr Roth was consulted, no surgical intervention recommended at this time          Sepsis (HCC)- (present on admission)   Assessment & Plan    This is sepsis (without associated acute organ dysfunction).   UTI on IV Zosyn  Follow blood cultures          UTI (urinary tract infection)   Assessment & Plan    Nitrate  positive, LE positive  Zosyn x 7 days          Elevated transaminase level- (present on admission)   Assessment & Plan    Along with hyperbilirubinemia  Secondary to metastatic cancer  Continue to monitor CMP        Elevated bilirubin- (present on admission)   Assessment & Plan    Likely secondary to obstruction from metastatic cancer  Continue to monitor CMP  Post 2 perc biliary stents.          Portal vein thrombosis- (present on admission)   Assessment & Plan    Secondary to metastatic cancer  Hold lovenox secondary to intervention, will resume if appropriate by oncology.          Quality-Core Measures   Reviewed items::  Labs reviewed, Medications reviewed and Radiology images reviewed  Galvez catheter::  No Galvez  DVT prophylaxis - mechanical:  SCDs  Antibiotics:  Treating active infection/contamination beyond 24 hours perioperative coverage

## 2018-06-08 NOTE — CARE PLAN
Problem: Nutritional:  Goal: Achieve adequate nutritional intake  Patient will consume >50% of meals   Outcome: MET Date Met: 06/08/18  Per chart review of ADL's, pt consumed % x 1 meal documented..  Met with pt and pt's wife at bedside and he reports he has been consistently consuming >50% of meals, denied GI distress and did not present with any further nutrition-related questions or concerns at this time.  Consult RD as needed, available prn and to rescreen per department policy

## 2018-06-09 NOTE — PROGRESS NOTES
Assumed care of patient from day-shift RN, patient is AA&Ox4, upself. Expressing 8/10 RUQ abdominal pain, medicated per MAR. Biliary drains draining brown liquid, dressings CD&I. Patient denies nausea.  PIV w/ IV-ABX& fluids infusing. Wife at bedside.  Call light within reach, bed in low and locked position. No other needs at this time. Q2 rounding in place.

## 2018-06-09 NOTE — CARE PLAN
Problem: Communication  Goal: The ability to communicate needs accurately and effectively will improve    Intervention: Educate patient and significant other/support system about the plan of care, procedures, treatments, medications and allow for questions  Plan of care discussed with patient and family at bedside, all agreeable.        Problem: Pain Management  Goal: Pain level will decrease to patient's comfort goal    Intervention: Follow pain managment plan developed in collaboration with patient and Interdisciplinary Team  Patient medicated for pain per MAR.

## 2018-06-09 NOTE — PROGRESS NOTES
[facility] Hospitalist Progress Note    Date of Service: 6/9/2018    Chief Complaint  67 y.o. male admitted 6/2/2018 with RUQ pain and findings on imaging consistent with metastatic cancer.    Interval Problem Update  6/5 Patient states he still has mild pain in the RUQ but it has improved.  Physical examination is not consistent with cholecystitis.  He has increasing bilirubin on CMP.  Pathology report pending, once resulted will consult oncology.  6/6 Patient states pain is improving and he is not needing medication to control it.  Discussed results of pathology showing adenocarcinoma and why GI and oncology were consulted.  Prior to discharge, GI recommends upper and lower endoscopies for complete workup for malignancy as patient has never had this intervention.  ERCP would likely not achieve the desired drop in bilirubin needed but IR stenting/rocio tube may help assist - d/w Dr Kendrick for procedure tomorrow.  Lovenox discontinued for portal vein thrombosis and SCDs to be placed for DVT prophylaxis.  NPO p mn ordered.  6/7 Patient without complaint this am, states mild RUQ pain.  He tolerated biliary drain placement without issue.  I spoke with Dr Hayes and given the significant tumor burden, required 2 drains to be placed and would not be appropriate for internalization at a later date, will have to remain as perc drains.  Will continue to monitor bilirubin to see if further chemotherapy interventions become appropriate.  Goal of bilirubin will be of less than 4.  6/8 Patient with increased pain in RUQ today, stated didn't feel it was from the drains but like it was on admission.  Flexeril prn ordered with decrease in pain.  Bilirubin with slight drop since drains placed, will continue to monitor level.  6/9 Patient states he is having increased pain over biliary drains today, WBC is trending up with pain but came up following intervention.  He remains afebrile and is still on zosyn.  He responds well to flexeril  admin for pain better than the narcotic.  He is receiving bowel protocol but has been 4 days since last bowel movement, may need to start scheduled lactulose.  Bilirubin down to 13.8.    Consultants/Specialty  elton connor  Oncology - [facility]  GI - [facility]    Disposition  tbd        Review of Systems   Constitutional: Negative for chills and fever.   HENT: Negative for congestion.    Eyes: Negative for blurred vision and photophobia.   Respiratory: Negative for cough and shortness of breath.    Cardiovascular: Negative for chest pain, claudication and leg swelling.   Gastrointestinal: Positive for abdominal pain (increased intensity - over biliary drains.). Negative for constipation, diarrhea, heartburn, nausea and vomiting.   Genitourinary: Negative for dysuria and hematuria.   Musculoskeletal: Negative for joint pain and myalgias.   Skin: Negative for itching and rash.   Neurological: Negative for dizziness, sensory change, speech change, weakness and headaches.   Psychiatric/Behavioral: Negative for depression. The patient is not nervous/anxious and does not have insomnia.       Physical Exam  Laboratory/Imaging   Hemodynamics  Temp (24hrs), Av.7 °C (98 °F), Min:36.4 °C (97.6 °F), Max:36.9 °C (98.5 °F)   Temperature: 36.9 °C (98.5 °F)  Pulse  Av  Min: 59  Max: 98    Blood Pressure : 111/58      Respiratory      Respiration: 18, Pulse Oximetry: 90 %        RUL Breath Sounds: Clear, RML Breath Sounds: Diminished, RLL Breath Sounds: Diminished, AKIKO Breath Sounds: Clear, LLL Breath Sounds: Diminished    Fluids    Intake/Output Summary (Last 24 hours) at 18 1536  Last data filed at 18 0400   Gross per 24 hour   Intake                0 ml   Output              220 ml   Net             -220 ml       Nutrition  Orders Placed This Encounter   Procedures   • DIET ORDER     Standing Status:   Standing     Number of Occurrences:   1     Order Specific Question:   Diet:     Answer:   Regular [1]     Physical  Exam   Constitutional: He is oriented to person, place, and time. He appears well-developed and well-nourished. No distress.   HENT:   Head: Normocephalic and atraumatic.   Eyes: Conjunctivae are normal.   Neck: Neck supple. No JVD present.   Cardiovascular: Normal rate, regular rhythm and normal heart sounds.  Exam reveals no gallop and no friction rub.    No murmur heard.  Pulmonary/Chest: Effort normal and breath sounds normal. No respiratory distress. He has no wheezes. He exhibits no tenderness.   Abdominal: Soft. Bowel sounds are normal. He exhibits no distension and no mass. There is tenderness (RUQ increased pain today).   2 biliary drains in place, bile in collection bags.     Musculoskeletal: He exhibits no edema or tenderness.   Neurological: He is alert and oriented to person, place, and time. No cranial nerve deficit.   Skin: Skin is warm and dry. He is not diaphoretic. No erythema. No pallor.   Jaundice     Psychiatric: He has a normal mood and affect. His behavior is normal.   Nursing note and vitals reviewed.      Recent Labs      06/07/18   0853  06/08/18   0302  06/09/18   0037   WBC  10.4  10.7  13.1*   RBC  3.35*  3.13*  3.35*   HEMOGLOBIN  10.6*  10.0*  10.8*   HEMATOCRIT  31.7*  30.2*  32.3*   MCV  94.6  96.5  96.4   MCH  31.6  31.9  32.2   MCHC  33.4*  33.1*  33.4*   RDW  70.9*  73.0*  74.0*   PLATELETCT  348  331  360   MPV  9.0  9.1  9.2     Recent Labs      06/07/18   0853  06/08/18   0040  06/09/18   0037   SODIUM  131*  131*  128*   POTASSIUM  3.5*  4.2  3.7   CHLORIDE  99  100  97   CO2  23  25  25   GLUCOSE  103*  97  124*   BUN  12  11  17   CREATININE  0.73  0.77  0.96   CALCIUM  8.1*  8.2*  8.2*     Recent Labs      06/07/18   0854   INR  1.31*                  Assessment/Plan     Metastatic cancer (HCC)- (present on admission)   Assessment & Plan    Liver biopsy 6/4  Pathology reports adenocarcinoma favoring upper GI origin  Oncology consulted  GI, Dr Jordan consulted for possible  intervention, upper and lower endoscopies early next week as bilirubin coming down.  2 percutaneous drains placed in IR, will continue to trend bilirubin.  Lungs, liver, gallbladder mets          Cholecystitis, acute- (present on admission)   Assessment & Plan    Ultrasound findings concerning for acalculous cholecystitis in the setting metastatic cancer, exam not consistent with acute rocio.  Minimal pain  Surgery Dr Roth was consulted, no surgical intervention recommended at this time          Sepsis (HCC)- (present on admission)   Assessment & Plan    This is sepsis (without associated acute organ dysfunction).   UTI on IV Zosyn  Follow blood cultures          UTI (urinary tract infection)   Assessment & Plan    Nitrate positive, LE positive  Zosyn x 7 days          Elevated transaminase level- (present on admission)   Assessment & Plan    Along with hyperbilirubinemia  Secondary to metastatic cancer  Continue to monitor CMP        Elevated bilirubin- (present on admission)   Assessment & Plan    Likely secondary to obstruction from metastatic cancer  Continue to monitor CMP  Post 2 perc biliary stents - having pain at insertion sites.          Portal vein thrombosis- (present on admission)   Assessment & Plan    Secondary to metastatic cancer  Hold lovenox secondary to intervention, will resume if appropriate by oncology.          Quality-Core Measures   Reviewed items::  Labs reviewed, Medications reviewed and Radiology images reviewed  Galvez catheter::  No Galvez  DVT prophylaxis - mechanical:  SCDs  Antibiotics:  Treating active infection/contamination beyond 24 hours perioperative coverage

## 2018-06-09 NOTE — CARE PLAN
Problem: Safety  Goal: Will remain free from falls    Intervention: Assess risk factors for falls  Patient up self with a steady gait      Problem: Pain Management  Goal: Pain level will decrease to patient's comfort goal    Intervention: Follow pain managment plan developed in collaboration with patient and Interdisciplinary Team  Pain medication given as needed, will continue to monitor for pain, K-pad ordered

## 2018-06-09 NOTE — PROGRESS NOTES
[facility] Hospitalist Progress Note    Date of Service: 6/8/2018    Chief Complaint  67 y.o. male admitted 6/2/2018 with RUQ pain and findings on imaging consistent with metastatic cancer.    Interval Problem Update  6/5 Patient states he still has mild pain in the RUQ but it has improved.  Physical examination is not consistent with cholecystitis.  He has increasing bilirubin on CMP.  Pathology report pending, once resulted will consult oncology.  6/6 Patient states pain is improving and he is not needing medication to control it.  Discussed results of pathology showing adenocarcinoma and why GI and oncology were consulted.  Prior to discharge, GI recommends upper and lower endoscopies for complete workup for malignancy as patient has never had this intervention.  ERCP would likely not achieve the desired drop in bilirubin needed but IR stenting/rocio tube may help assist - d/w Dr Kendrick for procedure tomorrow.  Lovenox discontinued for portal vein thrombosis and SCDs to be placed for DVT prophylaxis.  NPO p mn ordered.  6/7 Patient without complaint this am, states mild RUQ pain.  He tolerated biliary drain placement without issue.  I spoke with Dr Hayes and given the significant tumor burden, required 2 drains to be placed and would not be appropriate for internalization at a later date, will have to remain as perc drains.  Will continue to monitor bilirubin to see if further chemotherapy interventions become appropriate.  Goal of bilirubin will be of less than 4.  6/8 Patient with increased pain in RUQ today, stated didn't feel it was from the drains but like it was on admission.  Flexeril prn ordered with decrease in pain.  Bilirubin with slight drop since drains placed, will continue to monitor level.    Consultants/Specialty  elton connor  Oncology - [place]  GI - [facility]    Disposition  tbd        Review of Systems   Constitutional: Negative for chills and fever.   HENT: Negative for congestion.    Eyes: Negative for  blurred vision and photophobia.   Respiratory: Negative for cough and shortness of breath.    Cardiovascular: Negative for chest pain, claudication and leg swelling.   Gastrointestinal: Positive for abdominal pain (increased intensity). Negative for constipation, diarrhea, heartburn, nausea and vomiting.   Genitourinary: Negative for dysuria and hematuria.   Musculoskeletal: Negative for joint pain and myalgias.   Skin: Negative for itching and rash.   Neurological: Negative for dizziness, sensory change, speech change, weakness and headaches.   Psychiatric/Behavioral: Negative for depression. The patient is not nervous/anxious and does not have insomnia.       Physical Exam  Laboratory/Imaging   Hemodynamics  Temp (24hrs), Av.8 °C (98.2 °F), Min:36.6 °C (97.8 °F), Max:37.2 °C (98.9 °F)   Temperature: 36.7 °C (98 °F)  Pulse  Av.7  Min: 59  Max: 98    Blood Pressure : 113/69      Respiratory      Respiration: 18, Pulse Oximetry: 96 %        RUL Breath Sounds: Clear, RML Breath Sounds: Clear, RLL Breath Sounds: Diminished, AKIKO Breath Sounds: Clear, LLL Breath Sounds: Diminished    Fluids    Intake/Output Summary (Last 24 hours) at 18 1852  Last data filed at 18 1700   Gross per 24 hour   Intake                0 ml   Output              705 ml   Net             -705 ml       Nutrition  Orders Placed This Encounter   Procedures   • DIET ORDER     Standing Status:   Standing     Number of Occurrences:   1     Order Specific Question:   Diet:     Answer:   Regular [1]     Physical Exam   Constitutional: He is oriented to person, place, and time. He appears well-developed and well-nourished. No distress.   HENT:   Head: Normocephalic and atraumatic.   Eyes: Conjunctivae are normal.   Neck: Neck supple. No JVD present.   Cardiovascular: Normal rate, regular rhythm and normal heart sounds.  Exam reveals no gallop and no friction rub.    No murmur heard.  Pulmonary/Chest: Effort normal and breath sounds  normal. No respiratory distress. He has no wheezes. He exhibits no tenderness.   Abdominal: Soft. Bowel sounds are normal. He exhibits no distension and no mass. There is tenderness (RUQ increased pain today).   2 biliary drains in place, bile in collection bags.     Musculoskeletal: He exhibits no edema or tenderness.   Neurological: He is alert and oriented to person, place, and time. No cranial nerve deficit.   Skin: Skin is warm and dry. He is not diaphoretic. No erythema. No pallor.   Jaundice     Psychiatric: He has a normal mood and affect. His behavior is normal.   Nursing note and vitals reviewed.      Recent Labs      06/06/18   0041  06/07/18   0853  06/08/18   0302   WBC  10.2  10.4  10.7   RBC  3.26*  3.35*  3.13*   HEMOGLOBIN  10.3*  10.6*  10.0*   HEMATOCRIT  30.6*  31.7*  30.2*   MCV  93.9  94.6  96.5   MCH  31.6  31.6  31.9   MCHC  33.7  33.4*  33.1*   RDW  69.2*  70.9*  73.0*   PLATELETCT  336  348  331   MPV  9.3  9.0  9.1     Recent Labs      06/06/18   0041  06/07/18   0853  06/08/18   0040   SODIUM  132*  131*  131*   POTASSIUM  3.6  3.5*  4.2   CHLORIDE  100  99  100   CO2  24  23  25   GLUCOSE  95  103*  97   BUN  14  12  11   CREATININE  0.85  0.73  0.77   CALCIUM  8.1*  8.1*  8.2*     Recent Labs      06/07/18   0854   INR  1.31*                  Assessment/Plan     Metastatic cancer (HCC)- (present on admission)   Assessment & Plan    Liver biopsy 6/4  Pathology reports adenocarcinoma favoring upper GI origin  Oncology consulted  GI, Dr Jordan consulted for possible intervention, upper and lower endoscopies pending reduction of bilirubin  2 percutaneous drains placed in IR, will continue to trend bilirubin.  Lungs, liver, gallbladder mets          Cholecystitis, acute- (present on admission)   Assessment & Plan    Ultrasound findings concerning for acalculous cholecystitis in the setting metastatic cancer, exam not consistent with acute rocio.  Minimal pain  Surgery Dr Roth was  consulted, no surgical intervention recommended at this time          Sepsis (HCC)- (present on admission)   Assessment & Plan    This is sepsis (without associated acute organ dysfunction).   UTI on IV Zosyn  Follow blood cultures          UTI (urinary tract infection)   Assessment & Plan    Nitrate positive, LE positive  Zosyn x 7 days          Elevated transaminase level- (present on admission)   Assessment & Plan    Along with hyperbilirubinemia  Secondary to metastatic cancer  Continue to monitor CMP        Elevated bilirubin- (present on admission)   Assessment & Plan    Likely secondary to obstruction from metastatic cancer  Continue to monitor CMP  Post 2 perc biliary stents.          Portal vein thrombosis- (present on admission)   Assessment & Plan    Secondary to metastatic cancer  Hold lovenox secondary to intervention, will resume if appropriate by oncology.          Quality-Core Measures   Reviewed items::  Labs reviewed, Medications reviewed and Radiology images reviewed  Galvez catheter::  No Galvez  DVT prophylaxis - mechanical:  SCDs  Antibiotics:  Treating active infection/contamination beyond 24 hours perioperative coverage

## 2018-06-09 NOTE — PROGRESS NOTES
Patient alert and oriented up self. Family at bedside this am. Patient has 2 biliary drains on the right side with positive output. Reviewed POC with patient call light within reach hourly rounding in practice.

## 2018-06-10 PROBLEM — D72.829 LEUKOCYTOSIS: Status: ACTIVE | Noted: 2018-01-01

## 2018-06-10 NOTE — PROGRESS NOTES
Oncology/Hematology Progress Note               Author: Jan KENDRA Rossi Date & Time created: 6/10/2018  1:11 PM     CC: Unknown primary    Interval History:    I discussed with him that we really don't know more about his pathology but the fact that it's an upper GI source most likely. We discussed at this point since his bilirubin is slowly coming down that may be a GI evaluation is still reasonable to do.    Review of Systems:  Review of Systems   Constitutional: Positive for malaise/fatigue.   HENT: Negative.    Eyes: Negative.    Respiratory: Negative.    Cardiovascular: Negative.    Gastrointestinal: Negative.    Genitourinary: Negative.    Musculoskeletal: Negative.    Skin: Negative.    Endo/Heme/Allergies: Negative.        Physical Exam:  Physical Exam   HENT:   Head: Normocephalic.   Eyes: Pupils are equal, round, and reactive to light. Scleral icterus is present.   Cardiovascular: Normal rate and regular rhythm.    Pulmonary/Chest: Effort normal and breath sounds normal.   Abdominal: Soft. Bowel sounds are normal.       Labs:        Invalid input(s): [id number]      Recent Labs      06/08/18   0040  06/09/18   0037  06/10/18   0028   SODIUM  131*  128*  129*   POTASSIUM  4.2  3.7  4.1   CHLORIDE  100  97  97   CO2  25  25  26   BUN  11  17  14   CREATININE  0.77  0.96  0.75   CALCIUM  8.2*  8.2*  8.2*     Recent Labs      06/08/18   0040  06/09/18   0037  06/10/18   0028   ALTSGPT  34  39  33   ASTSGOT  87*  98*  73*   ALKPHOSPHAT  184*  183*  150*   TBILIRUBIN  16.0*  13.8*  12.6*   GLUCOSE  97  124*  94     Recent Labs      06/08/18   0302  06/09/18   0037  06/10/18   0028   RBC  3.13*  3.35*  3.12*   HEMOGLOBIN  10.0*  10.8*  9.9*   HEMATOCRIT  30.2*  32.3*  30.2*   PLATELETCT  331  360  343     Recent Labs      06/08/18   0040  06/08/18   0302  06/09/18   0037  06/10/18   0028   WBC   --   10.7  13.1*  15.6*   NEUTSPOLYS   --   82.60*  82.60*  81.60*   LYMPHOCYTES   --   8.70*  8.50*  4.40*    MONOCYTES   --   7.20  7.20  12.30   EOSINOPHILS   --   0.40  0.70  0.80   BASOPHILS   --   0.40  0.20  0.00   ASTSGOT  87*   --   98*  73*   ALTSGPT  34   --   39  33   ALKPHOSPHAT  184*   --   183*  150*   TBILIRUBIN  16.0*   --   13.8*  12.6*     Recent Labs      18   0040  18   0037  06/10/18   0028   SODIUM  131*  128*  129*   POTASSIUM  4.2  3.7  4.1   CHLORIDE  100  97  97   CO2  25  25  26   GLUCOSE  97  124*  94   BUN  11  17  14   CREATININE  0.77  0.96  0.75   CALCIUM  8.2*  8.2*  8.2*     Hemodynamics:  Temp (24hrs), Av.1 °C (98.7 °F), Min:36.7 °C (98.1 °F), Max:37.3 °C (99.1 °F)  Temperature: 36.7 °C (98.1 °F)  Pulse  Av.1  Min: 59  Max: 98   Blood Pressure : 117/74     Respiratory:    Respiration: 18, Pulse Oximetry: 91 %        RUL Breath Sounds: Clear, RML Breath Sounds: Clear;Diminished, RLL Breath Sounds: Diminished, AKIKO Breath Sounds: Clear, LLL Breath Sounds: Diminished  Fluids:    Intake/Output Summary (Last 24 hours) at 06/10/18 1311  Last data filed at 06/10/18 0424   Gross per 24 hour   Intake                0 ml   Output               20 ml   Net              -20 ml     Weight: 99.4 kg (219 lb 2.2 oz)  GI/Nutrition:  Orders Placed This Encounter   Procedures   • DIET ORDER     Standing Status:   Standing     Number of Occurrences:   1     Order Specific Question:   Diet:     Answer:   Clear Liquid [10]   • DIET NPO     Standing Status:   Standing     Number of Occurrences:   8     Order Specific Question:   Restrict to:     Answer:   Sips with Medications [3]     Medical Decision Making, by Problem:  Active Hospital Problems    Diagnosis   • Metastatic cancer (HCC) [C79.9]   • Sepsis (HCC) [A41.9]   • Cholecystitis, acute [K81.0]   • Portal vein thrombosis [I81]   • Leukocytosis [D72.829]   • UTI (urinary tract infection) [N39.0]   • Elevated bilirubin [R17]   • Elevated transaminase level [R74.0]     Past medical history, past social history, past social history,  reviewed unchanged  Plan:    1.  Oncology: I had a discussion with him today just about the fact that it is not adenocarcinoma favoring GI origin. I told him I think it's reasonable for the primary service now to call GI again for possible endoscopies. I discussed with him that his disease is advanced and if his bilirubin does not continue to trend down significantly to under 3 combination options will be very difficult to do. He understands that treatment is palliative. He understands there is no curative treatment. I told him if his stents stop working and his levels are still elevated that we may be heading more towards a palliative route.    He seems that at this time willing to still continue with testing. He will see me in clinic in 7-14 days. Hopefully, his bilirubin will be close to normal. I told him if GI does do endoscopies we can at least make sure he doesn't have any upper or lower GI malignancy. He understands that his prognosis is very guarded. I discussed all this with Dr. Lopez.    Quality-Core Measures

## 2018-06-10 NOTE — PROGRESS NOTES
Assumed care at 0700.   Received bedside report from day RN.   Pt A/Ox4. Pt denies pain, but visibly in pain.   Remaining stoic, refusing interventions at this time, despite education.   Pt assessed, labs and notes reviewed. Medicated per MAR.   POC discussed; pt agreeable to goals.    CT and bowel prep today, colonoscopy with EGD tomorrow.  Pt board updated and pt informed of phone ext's, and hourly rounding.   Bed alarm not in use despite education by 2 RNs. Pt is SBA, steady.   Pt needs are met at this time. Call light and phone within reach.

## 2018-06-10 NOTE — CARE PLAN
Problem: Safety  Goal: Will remain free from injury    Intervention: Provide assistance with mobility  Patient educated on unit policies and procedures to prevent fall risk, including: hourly rounding, call light usage, bed alarms, treaded socks and calling for assistance. Patient Tangi verbalizes understanding of teaching. Fall prevention measures assessed and in place.       Problem: Pain Management  Goal: Pain level will decrease to patient's comfort goal    Intervention: Educate and implement non-pharmacologic comfort measures. Examples: relaxation, distration, play therapy, activity therapy, massage, etc.  Pain assessed and documented per unit protocol pt refused pharmalogical and nonpharmacologic interventions.Pt educated on pain control. Pt verbalized understanding but continued to refuse.

## 2018-06-10 NOTE — CARE PLAN
Problem: Safety  Goal: Will remain free from falls  Outcome: PROGRESSING AS EXPECTED  Pt is free from accidental injury. Fall precautions are in place. Treaded socks in use, appropriate sign on the door, personal possessions and call light within reach, bed in low, and gait assessed.     Problem: Bowel/Gastric:  Goal: Normal bowel function is maintained or improved  Outcome: PROGRESSING SLOWER THAN EXPECTED  Last BM charted yesterday, per pt report. Bowel prep today.    Problem: Pain Management  Goal: Pain level will decrease to patient's comfort goal  Outcome: NOT MET  Pt refusing intervention despite obvious pain. Education provided and encouragement, still refusing.

## 2018-06-10 NOTE — PROGRESS NOTES
Gastroenterology Progress Note     Author: Tre Rodriguez   Date & Time Created: 6/10/2018 4:42 PM    Chief Complaint:  None immediate    Interval History:  Bilirubin improved    Review of Systems:  Review of Systems   Constitutional: Positive for malaise/fatigue.   Respiratory: Negative for shortness of breath.    Cardiovascular: Negative for chest pain.   Gastrointestinal:        Biliary external drainage still jaundiced       Physical Exam:  Physical Exam   Constitutional: He is oriented to person, place, and time. He appears well-nourished. He appears distressed.   Eyes: Scleral icterus is present.   Neck: No JVD present. No tracheal deviation present.   Cardiovascular: Normal rate.    Pulmonary/Chest: Effort normal.   Abdominal: Soft.   Neurological: He is alert and oriented to person, place, and time.   Skin: Skin is warm and dry.       Labs:        Invalid input(s): [id number]      Recent Labs      06/08/18   0040  06/09/18   0037  06/10/18   0028   SODIUM  131*  128*  129*   POTASSIUM  4.2  3.7  4.1   CHLORIDE  100  97  97   CO2  25  25  26   BUN  11  17  14   CREATININE  0.77  0.96  0.75   CALCIUM  8.2*  8.2*  8.2*     Recent Labs      06/08/18   0040  06/09/18   0037  06/10/18   0028   ALTSGPT  34  39  33   ASTSGOT  87*  98*  73*   ALKPHOSPHAT  184*  183*  150*   TBILIRUBIN  16.0*  13.8*  12.6*   GLUCOSE  97  124*  94     Recent Labs      06/08/18   0302  06/09/18   0037  06/10/18   0028   RBC  3.13*  3.35*  3.12*   HEMOGLOBIN  10.0*  10.8*  9.9*   HEMATOCRIT  30.2*  32.3*  30.2*   PLATELETCT  331  360  343     Recent Labs      06/08/18   0040  06/08/18   0302  06/09/18   0037  06/10/18   0028   WBC   --   10.7  13.1*  15.6*   NEUTSPOLYS   --   82.60*  82.60*  81.60*   LYMPHOCYTES   --   8.70*  8.50*  4.40*   MONOCYTES   --   7.20  7.20  12.30   EOSINOPHILS   --   0.40  0.70  0.80   BASOPHILS   --   0.40  0.20  0.00   ASTSGOT  87*   --   98*  73*   ALTSGPT  34   --   39  33   ALKPHOSPHAT  184*   --    183*  150*   TBILIRUBIN  16.0*   --   13.8*  12.6*     Hemodynamics:  Temp (24hrs), Av °C (98.6 °F), Min:36.7 °C (98.1 °F), Max:37.3 °C (99.1 °F)  Temperature: 36.9 °C (98.5 °F)  Pulse  Av  Min: 59  Max: 98   Blood Pressure : 127/80     Respiratory:    Respiration: 18, Pulse Oximetry: 95 %        RUL Breath Sounds: Clear, RML Breath Sounds: Clear;Diminished, RLL Breath Sounds: Diminished, AKIKO Breath Sounds: Clear, LLL Breath Sounds: Diminished  Fluids:    Intake/Output Summary (Last 24 hours) at 06/10/18 1642  Last data filed at 06/10/18 1100   Gross per 24 hour   Intake             1014 ml   Output               20 ml   Net              994 ml     Weight: 99.4 kg (219 lb 2.2 oz)  GI/Nutrition:  Orders Placed This Encounter   Procedures   • DIET ORDER     Standing Status:   Standing     Number of Occurrences:   1     Order Specific Question:   Diet:     Answer:   Clear Liquid [10]   • DIET NPO     Standing Status:   Standing     Number of Occurrences:   8     Order Specific Question:   Restrict to:     Answer:   Sips with Medications [3]     Medical Decision Making, by Problem:  Active Hospital Problems    Diagnosis   • Metastatic cancer (HCC) [C79.9]   • Sepsis (HCC) [A41.9]   • Cholecystitis, acute [K81.0]   • Portal vein thrombosis [I81]   • Leukocytosis [D72.829]   • UTI (urinary tract infection) [N39.0]   • Elevated bilirubin [R17]   • Elevated transaminase level [R74.0]       Plan:  Biliary obstruction with malignancy primary unknown.    Request for gastrointestinal endoscopy to clear the gut as source of tumor.    EGD and colonoscopy tomorrow if bowel is prepped (he has been constipated lately)    Discussed with patient and Dr. Lopez.    Quality-Core Measures

## 2018-06-10 NOTE — PROGRESS NOTES
[facility] Hospitalist Progress Note    Date of Service: 6/10/2018    Chief Complaint  67 y.o. male admitted 6/2/2018 with RUQ pain and findings on imaging consistent with metastatic cancer.    Interval Problem Update  6/5 Patient states he still has mild pain in the RUQ but it has improved.  Physical examination is not consistent with cholecystitis.  He has increasing bilirubin on CMP.  Pathology report pending, once resulted will consult oncology.  6/6 Patient states pain is improving and he is not needing medication to control it.  Discussed results of pathology showing adenocarcinoma and why GI and oncology were consulted.  Prior to discharge, GI recommends upper and lower endoscopies for complete workup for malignancy as patient has never had this intervention.  ERCP would likely not achieve the desired drop in bilirubin needed but IR stenting/rocio tube may help assist - d/w Dr Kendrick for procedure tomorrow.  Lovenox discontinued for portal vein thrombosis and SCDs to be placed for DVT prophylaxis.  NPO p mn ordered.  6/7 Patient without complaint this am, states mild RUQ pain.  He tolerated biliary drain placement without issue.  I spoke with Dr Hayes and given the significant tumor burden, required 2 drains to be placed and would not be appropriate for internalization at a later date, will have to remain as perc drains.  Will continue to monitor bilirubin to see if further chemotherapy interventions become appropriate.  Goal of bilirubin will be of less than 4.  6/8 Patient with increased pain in RUQ today, stated didn't feel it was from the drains but like it was on admission.  Flexeril prn ordered with decrease in pain.  Bilirubin with slight drop since drains placed, will continue to monitor level.  6/9 Patient states he is having increased pain over biliary drains today, WBC is trending up with pain but came up following intervention.  He remains afebrile and is still on zosyn.  He responds well to flexeril  "admin for pain better than the narcotic.  He is receiving bowel protocol but has been 4 days since last bowel movement, may need to start scheduled lactulose.  Bilirubin down to 13.8.  6/10 Patient without complaint today, states had bowel movement this am but still isn't back to \"normal\" with his bowels.  Bilirubin down to 12.6.  I have a call out to GI to see if they can accommodate upper and lower endoscopies tomorrow.  WBC continues to trend up since drains placed and pain at that site, will check oral contrast CT abdomen with inclusion of pelvis to r/o source of infection.  Patient remains afebrile and had been on zoysn x 7 days and wbc elevation could be due to the foreign body presence with drains.    Consultants/Specialty  elton connor  Oncology - Rossi  GI - Seck    Disposition  tbd        Review of Systems   Constitutional: Negative for chills and fever.   HENT: Negative for congestion.    Eyes: Negative for blurred vision and photophobia.   Respiratory: Negative for cough and shortness of breath.    Cardiovascular: Negative for chest pain, claudication and leg swelling.   Gastrointestinal: Positive for abdominal pain (increased intensity - over biliary drains.). Negative for constipation, diarrhea, heartburn, nausea and vomiting.   Genitourinary: Negative for dysuria and hematuria.   Musculoskeletal: Negative for joint pain and myalgias.   Skin: Negative for itching and rash.   Neurological: Negative for dizziness, sensory change, speech change, weakness and headaches.   Psychiatric/Behavioral: Negative for depression. The patient is not nervous/anxious and does not have insomnia.       Physical Exam  Laboratory/Imaging   Hemodynamics  Temp (24hrs), Av.1 °C (98.7 °F), Min:36.7 °C (98.1 °F), Max:37.3 °C (99.1 °F)   Temperature: 36.7 °C (98.1 °F)  Pulse  Av.1  Min: 59  Max: 98    Blood Pressure : 117/74      Respiratory      Respiration: 18, Pulse Oximetry: 91 %        RUL Breath Sounds: Clear, " RML Breath Sounds: Diminished, RLL Breath Sounds: Diminished, AKIKO Breath Sounds: Clear, LLL Breath Sounds: Diminished    Fluids    Intake/Output Summary (Last 24 hours) at 06/10/18 0820  Last data filed at 06/10/18 0424   Gross per 24 hour   Intake                0 ml   Output              130 ml   Net             -130 ml       Nutrition  Orders Placed This Encounter   Procedures   • DIET ORDER     Standing Status:   Standing     Number of Occurrences:   1     Order Specific Question:   Diet:     Answer:   Regular [1]     Physical Exam   Constitutional: He is oriented to person, place, and time. He appears well-developed and well-nourished. No distress.   HENT:   Head: Normocephalic and atraumatic.   Eyes: Conjunctivae are normal.   Neck: Neck supple. No JVD present.   Cardiovascular: Normal rate, regular rhythm and normal heart sounds.  Exam reveals no gallop and no friction rub.    No murmur heard.  Pulmonary/Chest: Effort normal and breath sounds normal. No respiratory distress. He has no wheezes. He exhibits no tenderness.   Abdominal: Soft. Bowel sounds are normal. He exhibits no distension and no mass. There is tenderness (RUQ increased pain today).   2 biliary drains in place, bile in collection bags.     Musculoskeletal: He exhibits no edema or tenderness.   Neurological: He is alert and oriented to person, place, and time. No cranial nerve deficit.   Skin: Skin is warm and dry. He is not diaphoretic. No erythema. No pallor.   Jaundice     Psychiatric: He has a normal mood and affect. His behavior is normal.   Nursing note and vitals reviewed.      Recent Labs      06/08/18   0302  06/09/18   0037  06/10/18   0028   WBC  10.7  13.1*  15.6*   RBC  3.13*  3.35*  3.12*   HEMOGLOBIN  10.0*  10.8*  9.9*   HEMATOCRIT  30.2*  32.3*  30.2*   MCV  96.5  96.4  96.8   MCH  31.9  32.2  31.7   MCHC  33.1*  33.4*  32.8*   RDW  73.0*  74.0*  74.1*   PLATELETCT  331  360  343   MPV  9.1  9.2  9.5     Recent Labs       06/08/18   0040  06/09/18   0037  06/10/18   0028   SODIUM  131*  128*  129*   POTASSIUM  4.2  3.7  4.1   CHLORIDE  100  97  97   CO2  25  25  26   GLUCOSE  97  124*  94   BUN  11  17  14   CREATININE  0.77  0.96  0.75   CALCIUM  8.2*  8.2*  8.2*     Recent Labs      06/07/18   0854   INR  1.31*                  Assessment/Plan     Metastatic cancer (HCC)- (present on admission)   Assessment & Plan    Liver biopsy 6/4  Pathology reports adenocarcinoma favoring upper GI origin  Oncology consulted  GI, Dr Jordan consulted for possible intervention, upper and lower endoscopies early next week as bilirubin coming down. - Call out to GI to see if these can be coordinated for Monday.  2 percutaneous drains placed in IR, will continue to trend bilirubin.  Lungs, liver, gallbladder mets          Cholecystitis, acute- (present on admission)   Assessment & Plan    Ultrasound findings concerning for acalculous cholecystitis in the setting metastatic cancer, exam not consistent with acute rocio.  Minimal pain  Surgery Dr Roth was consulted, no surgical intervention recommended at this time          Sepsis (HCC)- (present on admission)   Assessment & Plan    This is sepsis (without associated acute organ dysfunction).   Resolved          Leukocytosis   Assessment & Plan    Likely secondary to irritation from drains, patient afebrile, complaining of increased pain at the drain site  CT abdomen/pelvis with oral contrast only to eval for source of infection   Completed 7 days of zosyn yesterday  Rocephin until CT abdomen/pelvis resulted - if no signs of infection then will discontinue.          UTI (urinary tract infection)   Assessment & Plan    Nitrate positive, LE positive  Zosyn x 7 days  completed          Elevated transaminase level- (present on admission)   Assessment & Plan    Along with hyperbilirubinemia  Secondary to metastatic cancer  Continue to monitor CMP        Elevated bilirubin- (present on admission)    Assessment & Plan    Likely secondary to obstruction from metastatic cancer  Continue to monitor CMP  Post 2 perc biliary stents - having pain at insertion sites.          Portal vein thrombosis- (present on admission)   Assessment & Plan    Secondary to metastatic cancer  Hold lovenox secondary to intervention, will resume if appropriate by oncology.          Quality-Core Measures   Reviewed items::  Labs reviewed, Medications reviewed and Radiology images reviewed  Galvez catheter::  No Galvez  DVT prophylaxis - mechanical:  SCDs  Antibiotics:  Treating active infection/contamination beyond 24 hours perioperative coverage

## 2018-06-10 NOTE — ASSESSMENT & PLAN NOTE
Likely secondary to irritation from drains, patient afebrile, complaining of increased pain at the drain site  CT abdomen/pelvis with oral contrast only to eval for source of infection   Completed 7 days of zosyn yesterday  Rocephin until CT abdomen/pelvis resulted - if no signs of infection then will discontinue.

## 2018-06-10 NOTE — PROGRESS NOTES
Received report and assumed patient care at change of shift. Patient is resting in bed, A&O x4. Patient reports   8/10 pain at this time, however pt is refusing medication and nonpharmacalogical intervention.  Pt educated on controlling pain, pt verbalized understanding.   Plan of care discussed, questions answered. Bed is in the lowest position and locked, call light within reach, non-skid socks in place, hourly rounding. Patient reports no further needs and this time. Wife  at bedside.

## 2018-06-11 NOTE — OP REPORT
DATE OF SERVICE:  06/11/2018    PREOPERATIVE DIAGNOSIS:  Metastatic carcinoma of unknown primary.    POSTOPERATIVE DIAGNOSIS:  Gastritis.    PROCEDURE:  Esophagogastroduodenoscopy.    SUB-PROCEDURE:  None.    HISTORY:  This is a 67-year-old male with evidence of metastatic carcinoma.    Informed consent was obtained after explanation of risks, benefits, and   alternatives.    DESCRIPTION OF PROCEDURE:  Procedure was performed in the main endoscopy unit   at [facility].  Moderate sedation was given, supervised by me using   Versed 4 mg and fentanyl 100 mcg starting at 1122 concluding at 1158.  After   adequate sedation was achieved, Olympus  endoscope was passed via the   oropharynx into the esophagus without difficulty.  It was then advanced while   visualizing the lumen.  The esophagus was normal.  The endoscope passed easily   through the GE junction into the stomach.  It was then passed through the   pylorus into the duodenum.  Second portion of duodenum was reached and was   normal.  The endoscope was then withdrawn revealing normal duodenal bulb and   pylorus.  Gastric antrum and body showed erythema and superficial erosions   consistent with gastritis.  Retroflexion of the endoscope revealed a normal   gastric angularis, cardia, and fundus.  Endoscope was straightened and   withdrawn.  The patient tolerated the procedure well.  There were no apparent   complications.    IMPRESSION:  A 67-year-old male with metastatic carcinoma.  Upper endoscopy   shows gastritis, but no evidence of malignancy.  We will proceed with   colonoscopy.       ____________________________________     INDER COONEY DO    PIS / NTS    DD:  06/11/2018 12:04:44  DT:  06/11/2018 12:14:47    D#:  [id number]  Job#:  [id number]    cc: Selene Rossi MD

## 2018-06-11 NOTE — OR SURGEON
Immediate Post OP Note    PreOp Diagnosis: Metastatic cancer    PostOp Diagnosis: 1. Gastritis 2. 3cm pedunculated polyp in sigmoid colon    Procedure(s):  GASTROSCOPY-ENDO - Wound Class: Clean Contaminated  COLONOSCOPY w/ polypectomy/tattoo    Surgeon(s):  Eliot Arredondo D.O.    Anesthesiologist/Type of Anesthesia:  No anesthesia staff entered./Sedation    Surgical Staff:  Endoscopy Technician: Nicki Green  Sedation/Monitoring Nurse: Shwetha Page R.N.    Specimens removed if any:  * No specimens in log *    Estimated Blood Loss: none    Findings: above    Complications: none    Rec: Await pathology           CEA, CA 19-9      Will sign off. Call if needed.        6/11/2018 12:01 PM Eliot Arredondo D.O.

## 2018-06-11 NOTE — PROGRESS NOTES
No changes from epic. Aox4 VSS. Up self and steady. NPO for EGD/colonoscopy at 100 today. Consent obtained, signed and in chart. New PIV placed. IV abx. x2 biliary drains with small amounts of drainage. Pt denies pain, nausea. Wife at bedside. Call light and belongings within reach, able to make needs known, rounding in place, will monitor.

## 2018-06-11 NOTE — PROGRESS NOTES
Gastroenterology Progress Note      Note Author: Eric Cam M.D.   Supervising GI consultant: Maria Elena PICKERING       Name Eliceo Vyas     1950   Age/Sex 67 y.o. 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 Negative for chills, diaphoresis and fever.   HENT: Negative for hearing loss and tinnitus.    Eyes: Negative for blurred vision and double vision.   Respiratory: Negative for cough, hemoptysis, sputum production, shortness of breath and wheezing.    Cardiovascular: Negative for chest pain, palpitations, orthopnea and leg swelling.   Gastrointestinal: Positive for jaundice. Negative for blood in stool, constipation, diarrhea, heartburn, melena, nausea and vomiting.   Genitourinary: Negative for dysuria, frequency and urgency.        Dark colored urine   Skin: Negative for itching and rash.   Neurological: Negative for dizziness and headaches.   Endo/Heme/Allergies: Does not bruise/bleed easily.   Psychiatric/Behavioral: Negative for depression.       Quality-Core Measures   Reviewed items::  Labs reviewed, Medications reviewed and Radiology images reviewed  DVT prophylaxis: SCD        Physical Exam       Vitals:    06/10/18 1600 06/10/18 2100 18 0500 18 0756   BP: 127/80 117/73 127/78 116/69   Pulse: 72 85 69 82   Resp: 18 18 20 17   Temp: 36.9 °C (98.5 °F) 36.9 °C (98.5 °F) 36.1 °C (97 °F) 36.9 °C (98.4 °F)   SpO2: 95% 93% 95% 91%   Weight:       Height:         Body mass index is 29.72 kg/m². Weight: 99.4 kg (219 lb 2.2 oz)  Oxygen Therapy:   Pulse Oximetry: 91 %, O2 (LPM): 0, O2 Delivery: None (Room Air)    Physical Exam    Constitutional: He is oriented to person, place, and time and well-developed, well-nourished, and in no distress. No distress.   HENT:   Head: Normocephalic and atraumatic.   Eyes: Conjunctivae and EOM are normal. Pupils are equal, round, and reactive to light. Scleral icterus is present.   Neck: Normal range of motion. Neck supple. No JVD present. No tracheal deviation present.   Cardiovascular: Normal rate, regular rhythm and intact distal pulses.  Exam reveals no gallop and no friction rub.    No murmur heard.  Pulmonary/Chest: Effort normal and breath sounds normal. No respiratory distress. He has no wheezes. He has no rales. He exhibits no tenderness.   Abdominal: Soft. Bowel sounds are normal. He exhibits distension and mass. There is tenderness. There is no rebound and no guarding.   Tenderness at the right hypochondrial area, palpable liver edge or mass.  2 drains placed     Lab Data Review:         6/7/2018  2:35 PM    Recent Labs      06/09/18   0037  06/10/18   0028  06/11/18   0004   SODIUM  128*  129*  127*   POTASSIUM  3.7  4.1  3.4*   CHLORIDE  97  97  94*   CO2  25  26  23   BUN  17  14  11   CREATININE  0.96  0.75  0.52   CALCIUM  8.2*  8.2*  8.1*       Recent Labs      06/09/18   0037  06/10/18   0028  06/11/18   0004   ALTSGPT  39  33  30   ASTSGOT  98*  73*  74*   ALKPHOSPHAT  183*  150*  134*   TBILIRUBIN  13.8*  12.6*  12.5*   GLUCOSE  124*  94  92       Recent Labs      06/09/18   0037  06/10/18   0028  06/11/18   0004   RBC  3.35*  3.12*  3.12*   HEMOGLOBIN  10.8*  9.9*  10.2*   HEMATOCRIT  32.3*  30.2*  30.0*   PLATELETCT  360  343  331       Recent Labs      06/09/18   0037  06/10/18   0028  06/11/18   0004   WBC  13.1*  15.6*  13.0*   NEUTSPOLYS  82.60*  81.60*  79.20*   LYMPHOCYTES  8.50*  4.40*  8.80*   MONOCYTES  7.20  12.30  9.50   EOSINOPHILS  0.70  0.80  1.20   BASOPHILS  0.20  0.00  0.30   ASTSGOT   98*  73*  74*   ALTSGPT  39  33  30   ALKPHOSPHAT  183*  150*  134*   TBILIRUBIN  13.8*  12.6*  12.5*           Assessment/Plan       #  Metastatic Adeno carcinoma of unknown origin      Most likely upper GI source.  Biliary drainage was done under interventional radiology with no complications.  Bilirubin trended down to 12.5  Plan for EGD and colonoscopy today for more definitive diagnosis of the source of tumor      # Biliary duct obstruction  Due to adenocarcinoma.  external drainage was done under interventional radiology   follow-up bilirubin      # poral vein thrombosis     Hold anticoagulation at the current time.    .

## 2018-06-11 NOTE — PROGRESS NOTES
[facility]    Date of Service: 6/11/2018    Chief Complaint  67 y.o. male admitted 6/2/2018 with RUQ pain and findings on imaging consistent with metastatic cancer.    Interval Problem Update  6/5 Patient states he still has mild pain in the RUQ but it has improved.  Physical examination is not consistent with cholecystitis.  He has increasing bilirubin on CMP.  Pathology report pending, once resulted will consult oncology.  6/6 Patient states pain is improving and he is not needing medication to control it.  Discussed results of pathology showing adenocarcinoma and why GI and oncology were consulted.  Prior to discharge, GI recommends upper and lower endoscopies for complete workup for malignancy as patient has never had this intervention.  ERCP would likely not achieve the desired drop in bilirubin needed but IR stenting/rocio tube may help assist - d/w Dr Kendrick for procedure tomorrow.  Lovenox discontinued for portal vein thrombosis and SCDs to be placed for DVT prophylaxis.  NPO p mn ordered.  6/7 Patient without complaint this am, states mild RUQ pain.  He tolerated biliary drain placement without issue.  I spoke with Dr Hayes and given the significant tumor burden, required 2 drains to be placed and would not be appropriate for internalization at a later date, will have to remain as perc drains.  Will continue to monitor bilirubin to see if further chemotherapy interventions become appropriate.  Goal of bilirubin will be of less than 4.  6/8 Patient with increased pain in RUQ today, stated didn't feel it was from the drains but like it was on admission.  Flexeril prn ordered with decrease in pain.  Bilirubin with slight drop since drains placed, will continue to monitor level.  6/9 Patient states he is having increased pain over biliary drains today, WBC is trending up with pain but came up following intervention.  He remains afebrile and is still on zosyn.  He responds well to flexeril  "admin for pain better than the narcotic.  He is receiving bowel protocol but has been 4 days since last bowel movement, may need to start scheduled lactulose.  Bilirubin down to 13.8.  6/10 Patient without complaint today, states had bowel movement this am but still isn't back to \"normal\" with his bowels.  Bilirubin down to 12.6.  I have a call out to GI to see if they can accommodate upper and lower endoscopies tomorrow.  WBC continues to trend up since drains placed and pain at that site, will check oral contrast CT abdomen with inclusion of pelvis to r/o source of infection.  Patient remains afebrile and had been on zoysn x 7 days and wbc elevation could be due to the foreign body presence with drains.  6/11 Patient feeling well, he cleared well with bowel prep and will have upper and lower endoscopies this am.  Bilirubin about same today at 12.5.  Once endoscopic evaluation of GI completed, patient likely okay to dc home tomorrow with f/u with Dr Rossi in about 10 days to see what level his bilirubin is and what treatments are available.  If no masses seen on endoscopic evaluation, source is likely gallbladder.     Consultants/Specialty  elton - geeta  Oncology - Flo  GI - [facility]    Disposition  tbd        Review of Systems   Constitutional: Negative for chills and fever.   HENT: Negative for congestion.    Eyes: Negative for blurred vision and photophobia.   Respiratory: Negative for cough and shortness of breath.    Cardiovascular: Negative for chest pain, claudication and leg swelling.   Gastrointestinal: Positive for abdominal pain (improved with multiple BM's ). Negative for constipation, diarrhea, heartburn, nausea and vomiting.   Genitourinary: Negative for dysuria and hematuria.   Musculoskeletal: Negative for joint pain and myalgias.   Skin: Negative for itching and rash.   Neurological: Negative for dizziness, sensory change, speech change, weakness and headaches.   Psychiatric/Behavioral: Negative for " depression. The patient is not nervous/anxious and does not have insomnia.       Physical Exam  Laboratory/Imaging   Hemodynamics  Temp (24hrs), Av.7 °C (98.1 °F), Min:36.1 °C (97 °F), Max:36.9 °C (98.5 °F)   Temperature: 36.9 °C (98.4 °F)  Pulse  Av.1  Min: 59  Max: 98    Blood Pressure : 116/69      Respiratory      Respiration: 17, Pulse Oximetry: 91 %        RUL Breath Sounds: Clear, RML Breath Sounds: Clear;Diminished, RLL Breath Sounds: Diminished, AKIKO Breath Sounds: Clear, LLL Breath Sounds: Diminished    Fluids    Intake/Output Summary (Last 24 hours) at 18 1036  Last data filed at 18 0500   Gross per 24 hour   Intake             1014 ml   Output              110 ml   Net              904 ml       Nutrition  Orders Placed This Encounter   Procedures   • DIET NPO     Standing Status:   Standing     Number of Occurrences:   8     Order Specific Question:   Restrict to:     Answer:   Sips with Medications [3]     Physical Exam   Constitutional: He is oriented to person, place, and time. He appears well-developed and well-nourished. No distress.   HENT:   Head: Normocephalic and atraumatic.   Eyes: Conjunctivae are normal.   Neck: Neck supple. No JVD present.   Cardiovascular: Normal rate, regular rhythm and normal heart sounds.  Exam reveals no gallop and no friction rub.    No murmur heard.  Pulmonary/Chest: Effort normal and breath sounds normal. No respiratory distress. He has no wheezes. He exhibits no tenderness.   Abdominal: Soft. Bowel sounds are normal. He exhibits no distension and no mass. There is tenderness (RUQ pain better today ).   2 biliary drains in place, bile in collection bags.     Musculoskeletal: He exhibits no edema or tenderness.   Neurological: He is alert and oriented to person, place, and time. No cranial nerve deficit.   Skin: Skin is warm and dry. He is not diaphoretic. No erythema. No pallor.   Jaundice     Psychiatric: He has a normal mood and affect. His  behavior is normal.   Nursing note and vitals reviewed.      Recent Labs      06/09/18   0037  06/10/18   0028  06/11/18   0004   WBC  13.1*  15.6*  13.0*   RBC  3.35*  3.12*  3.12*   HEMOGLOBIN  10.8*  9.9*  10.2*   HEMATOCRIT  32.3*  30.2*  30.0*   MCV  96.4  96.8  96.2   MCH  32.2  31.7  32.7   MCHC  33.4*  32.8*  34.0   RDW  74.0*  74.1*  72.7*   PLATELETCT  360  343  331   MPV  9.2  9.5  9.0     Recent Labs      06/09/18   0037  06/10/18   0028  06/11/18   0004   SODIUM  128*  129*  127*   POTASSIUM  3.7  4.1  3.4*   CHLORIDE  97  97  94*   CO2  25  26  23   GLUCOSE  124*  94  92   BUN  17  14  11   CREATININE  0.96  0.75  0.52   CALCIUM  8.2*  8.2*  8.1*                      Assessment/Plan     Metastatic cancer (HCC)- (present on admission)   Assessment & Plan    Liver biopsy 6/4  Pathology reports adenocarcinoma favoring upper GI origin  Oncology consulted  GI, Dr Jordan consulted for possible intervention, upper and lower endoscopies today - Dr Christianson at 11am  2 percutaneous drains placed in IR, will continue to trend bilirubin.  Lungs, liver, gallbladder mets          Cholecystitis, acute- (present on admission)   Assessment & Plan    Ultrasound findings concerning for acalculous cholecystitis in the setting metastatic cancer, exam not consistent with acute rocio.  Minimal pain  Surgery Dr Roth was consulted, no surgical intervention recommended at this time          Sepsis (HCC)- (present on admission)   Assessment & Plan    This is sepsis (without associated acute organ dysfunction).   Resolved          Leukocytosis   Assessment & Plan    Likely secondary to irritation from drains, patient afebrile, complaining of increased pain at the drain site  CT abdomen/pelvis with oral contrast only to eval for source of infection   Completed 7 days of zosyn yesterday  Rocephin until CT abdomen/pelvis resulted - if no signs of infection then will discontinue.          UTI (urinary tract infection)    Assessment & Plan    Nitrate positive, LE positive  Zosyn x 7 days  completed          Elevated transaminase level- (present on admission)   Assessment & Plan    Along with hyperbilirubinemia  Secondary to metastatic cancer  Continue to monitor CMP        Elevated bilirubin- (present on admission)   Assessment & Plan    Likely secondary to obstruction from metastatic cancer  Continue to monitor CMP  Post 2 perc biliary stents - having pain at insertion sites - improved following bowel prep.          Portal vein thrombosis- (present on admission)   Assessment & Plan    Secondary to metastatic cancer  Hold lovenox secondary to intervention, will resume if appropriate by oncology.          Quality-Core Measures   Reviewed items::  Labs reviewed, Medications reviewed and Radiology images reviewed  Galvez catheter::  No Galvez  DVT prophylaxis - mechanical:  SCDs  Antibiotics:  Treating active infection/contamination beyond 24 hours perioperative coverage

## 2018-06-11 NOTE — OP REPORT
DATE OF SERVICE:  06/11/2018    PREOPERATIVE DIAGNOSIS:  Metastatic carcinoma.    POSTOPERATIVE DIAGNOSIS:  Three cm pedunculated polyp in sigmoid colon at 30   cm from the anal verge.    PROCEDURE:  Colonoscopy.    SUB-PROCEDURE:  1.  Hot snare polypectomy.  2.  Tattoo at polypectomy site.    HISTORY OF PRESENT ILLNESS:  This is a 67-year-old male with evidence of   metastatic carcinoma.  Informed consent was obtained after explanation of   risks, benefits, and alternatives.    DESCRIPTION OF PROCEDURE:  Procedure was performed in the main endoscopy unit   at [facility].  Moderate sedation was administered by nursing,   supervised by me starting at 1122 concluding at 1158 using Versed 4 mg,   fentanyl 100 mcg IV.  The Olympus  colonoscope was advanced via the anus   into the rectum.  It was then advanced by standard procedure to the cecum.    Quality of bowel prep was poor.  The cecum was identified by the appendiceal   orifice and the ileocecal valve.  Cecum was normal.  The colonoscope was then   withdrawn, obtaining circumferential views of lumen.  The ascending,   transverse, and descending colon were normal.  The sigmoid colon at 30 cm from   the anal verge, a 3 cm pedunculated somewhat ulcerative polyp was identified.    This was completely removed with piecemeal hot snare polypectomy technique.    The polypectomy site was tattooed in 4 quadrants with spot.  The rectum was   normal.  Retroflexion of the colonoscope in the rectum revealed no   abnormalities.  The colonoscope was straightened and withdrawn.  The patient   tolerated the procedure well.  There were no apparent complications.    IMPRESSION:  A 67-year-old male with metastatic carcinoma.  Colonoscopy shows   a 3 cm pedunculated polyp in the sigmoid colon, which was removed and the site   was tattooed.  We will await pathology.  In addition, we will check CEA and   CA 19-9 tumor markers.  If clinically appropriate, he should have  surveillance   colonoscopy in 1 year.       ____________________________________     DO TIKA BEGUM / NTS    DD:  06/11/2018 12:07:21  DT:  06/11/2018 12:19:24    D#:  [id number]  Job#:  [id number]    cc: Selene Rossi MD

## 2018-06-12 NOTE — DISCHARGE PLANNING
Anticipated Discharge Disposition: Home with Home Health    Action: Reviewed financial notes which indicate pt has Medicare A&B effective 7/1/2015 however coverage is not showing on pt's facesheet.  Sent communication to [facility] to please clarify and to request they meet with pt to screen for financial assistance.     Barriers to Discharge: None    Plan: SW will meet with pt to discuss HH choice.

## 2018-06-12 NOTE — DISCHARGE PLANNING
Care Transition Team Assessment    Pt is a 67-yo male admitted with RUQ pain and is newly diagnosed with metastatic cancer.  Per physician progress note pt will need to f/u with oncologist/Dr. Rossi in about 10 days to discuss treatment plan.  SW met with pt at bedside to discuss discharge plan and home health.  Pt's primary language is Kosovan but pt was able to engage in discussion with SW.  Pt confirmed he lives with his spouse Eric at [address] [place] [address], phone#: [phone].  He was previously indepdent with ADLs/IADLs.  Discussed home health care; pt states he feels he and his wife can care for his drains but agrees to speak with his wife Eric about this tonight.  SW agreed to check-in with pt tomorrow AM to f/u on HH choice.  Left choice form at bedside.        Information Source  Orientation : Oriented x 4  Information Given By: Patient    Readmission Evaluation  Is this a readmission?: No    Elopement Risk  Legal Hold: No  Ambulatory or Self Mobile in Wheelchair: Yes  Disoriented: No  Psychiatric Symptoms: None  History of Wandering: No  Elopement this Admit: No  Vocalizing Wanting to Leave: No  Displays Behaviors, Body Language Wanting to Leave: No-Not at Risk for Elopement  Elopement Risk: Not at Risk for Elopement    Interdisciplinary Discharge Planning  Does Admitting Nurse Feel This Could be a Complex Discharge?: No  Primary Care Physician: pt does not have pcp  Lives with - Patient's Self Care Capacity: Significant Other  Patient or legal guardian wants to designate a caregiver (see row info): No  Support Systems: Children, Family Member(s), Spouse / Significant Other  Housing / Facility: 1 Story Apartment / Condo  Do You Take your Prescribed Medications Regularly: Yes  Able to Return to Previous ADL's: Yes  Mobility Issues: No  Prior Services: None  Patient Expects to be Discharged to:: home  Assistance Needed: No  Durable Medical Equipment: Not Applicable    Discharge  Preparedness  What is your plan after discharge?: Home with help  What are your discharge supports?: Spouse  Prior Functional Level: Independent with Activities of Daily Living    Functional Assesment  Prior Functional Level: Independent with Activities of Daily Living    Finances  Financial Barriers to Discharge: No    Vision / Hearing Impairment  Vision Impairment : Yes  Right Eye Vision: Impaired, Wears Glasses  Left Eye Vision: Impaired, Wears Glasses  Hearing Impairment : No    Values / Beliefs / Concerns  Values / Beliefs Concerns : Yes    Advance Directive  Advance Directive?: None    Domestic Abuse  Have you ever been the victim of abuse or violence?: No  Physical Abuse or Sexual Abuse: No  Verbal Abuse or Emotional Abuse: No    Psychological Assessment  Newly Diagnosed Illness: Yes    Discharge Risks or Barriers  Discharge risks or barriers?: No PCP, Complex medical needs  Patient risk factors: Complex medical needs    Anticipated Discharge Information  Anticipated discharge disposition: Home  Discharge Address: [address] [place] [address] [place] [id number]  Discharge Contact Phone Number: [phone]

## 2018-06-12 NOTE — PROGRESS NOTES
Assume care of patient @ 1900 from MARIAELENA Peguero. Patient is A&Ox4, upself, denies pain, nausea/vomiting. Patient had esophagogastroduodenoscopy and colonoscopy today, and tolerated both procedures well. Patient 's wife is at bedside. Plan of care discussed with both patient and spouse, possible d/c tomorrow. Patient belongings at bedside, all needs met at this time, will continue to round Q2.

## 2018-06-12 NOTE — PROGRESS NOTES
No changes from epic. AOx4 VSS. Up self and steady. On .5 L O2 for comfort, but able to be on room air. PIV SL. Daily IV abx. Possible DC today and f/u with GI and Onc outpt. x2 biliary drains. Call light and belongings within reach, able to make needs known, rounding in place, will monitor.

## 2018-06-12 NOTE — PALLIATIVE CARE
"Palliative Care follow-up  Collaboration with BS RN.  Patient completed EGD/Colon 6/11/18. Results- \" gastritis, but no evidence of malignancy\", \"3 cm pedunculated polyp in sigmoid colon at 30 cm from the anal verge.\" Respectively.  Biliary stent unable to be placed; patient will remain with bilateral external biliary drains 2/2 to obstruction.  Potential for patient to discharge home today pending follow up with Oncology.    Updated: Marielena Coley, RN    Plan: Discharge home pending follow up with Oncology.    Thank you for allowing Palliative Care to participate in this patient's care. Please feel free to call [phone] with any questions or concerns.  "

## 2018-06-12 NOTE — PROGRESS NOTES
[facility] Hospitalist Progress Note    Date of Service: 6/12/2018    Chief Complaint  67 y.o. male admitted 6/2/2018 with RUQ pain and findings on imaging consistent with metastatic cancer.    Interval Problem Update  6/5 Patient states he still has mild pain in the RUQ but it has improved.  Physical examination is not consistent with cholecystitis.  He has increasing bilirubin on CMP.  Pathology report pending, once resulted will consult oncology.  6/6 Patient states pain is improving and he is not needing medication to control it.  Discussed results of pathology showing adenocarcinoma and why GI and oncology were consulted.  Prior to discharge, GI recommends upper and lower endoscopies for complete workup for malignancy as patient has never had this intervention.  ERCP would likely not achieve the desired drop in bilirubin needed but IR stenting/rocio tube may help assist - d/w Dr Kendrick for procedure tomorrow.  Lovenox discontinued for portal vein thrombosis and SCDs to be placed for DVT prophylaxis.  NPO p mn ordered.  6/7 Patient without complaint this am, states mild RUQ pain.  He tolerated biliary drain placement without issue.  I spoke with Dr Hayes and given the significant tumor burden, required 2 drains to be placed and would not be appropriate for internalization at a later date, will have to remain as perc drains.  Will continue to monitor bilirubin to see if further chemotherapy interventions become appropriate.  Goal of bilirubin will be of less than 4.  6/8 Patient with increased pain in RUQ today, stated didn't feel it was from the drains but like it was on admission.  Flexeril prn ordered with decrease in pain.  Bilirubin with slight drop since drains placed, will continue to monitor level.  6/9 Patient states he is having increased pain over biliary drains today, WBC is trending up with pain but came up following intervention.  He remains afebrile and is still on zosyn.  He responds well to flexeril  "admin for pain better than the narcotic.  He is receiving bowel protocol but has been 4 days since last bowel movement, may need to start scheduled lactulose.  Bilirubin down to 13.8.  6/10 Patient without complaint today, states had bowel movement this am but still isn't back to \"normal\" with his bowels.  Bilirubin down to 12.6.  I have a call out to GI to see if they can accommodate upper and lower endoscopies tomorrow.  WBC continues to trend up since drains placed and pain at that site, will check oral contrast CT abdomen with inclusion of pelvis to r/o source of infection.  Patient remains afebrile and had been on zoysn x 7 days and wbc elevation could be due to the foreign body presence with drains.  6/11 Patient feeling well, he cleared well with bowel prep and will have upper and lower endoscopies this am.  Bilirubin about same today at 12.5.  Once endoscopic evaluation of GI completed, patient likely okay to dc home tomorrow with f/u with Dr Rossi in about 10 days to see what level his bilirubin is and what treatments are available.  If no masses seen on endoscopic evaluation, source is likely gallbladder.   6/12 reports right sided abdominal discomfort, pain level of 5/10, controlled  Denies any nausea or vomiting, tolerating oral diet, status post EGD and colonoscopy on 6-11  Plan for discharge to home in a.m. if cleared by GI, to follow-up with oncology for pathology results  Room air oxygenation evaluated, will not need oxygen on discharge to home    Consultants/Specialty  elton connor  Oncology - Flo NATH - Nikki/Demi    Disposition  Home in 1-2 days with clinical improvement  Needs GI clearance  We will need home health for biliary drain care  Questionable reinitiation of anticoagulation for portal vein thrombosis prior to discharge home, oncology to follow        Review of Systems   Constitutional: Negative for chills, diaphoresis, fever and malaise/fatigue.   HENT: Negative for congestion.  "   Eyes: Negative for photophobia.   Respiratory: Negative for cough, sputum production, shortness of breath and wheezing.    Cardiovascular: Negative for chest pain, claudication and leg swelling.   Gastrointestinal: Positive for abdominal pain (improved with multiple BM's ). Negative for constipation, heartburn, nausea and vomiting.   Genitourinary: Negative for dysuria, hematuria and urgency.   Musculoskeletal: Negative for myalgias.   Neurological: Positive for weakness. Negative for loss of consciousness and headaches.   Psychiatric/Behavioral: The patient is not nervous/anxious and does not have insomnia.       Physical Exam  Laboratory/Imaging   Hemodynamics  Temp (24hrs), Av.6 °C (97.9 °F), Min:36.1 °C (97 °F), Max:37.2 °C (98.9 °F)   Temperature: 36.4 °C (97.5 °F)  Pulse  Av  Min: 59  Max: 98    Blood Pressure : 126/77      Respiratory      Respiration: (!) 21, Pulse Oximetry: 98 %        RUL Breath Sounds: Clear, RML Breath Sounds: Diminished, RLL Breath Sounds: Diminished, AKIKO Breath Sounds: Clear, LLL Breath Sounds: Diminished    Fluids    Intake/Output Summary (Last 24 hours) at 18 1537  Last data filed at 18 0900   Gross per 24 hour   Intake              240 ml   Output              100 ml   Net              140 ml       Nutrition  Orders Placed This Encounter   Procedures   • DIET ORDER     Standing Status:   Standing     Number of Occurrences:   1     Order Specific Question:   Diet:     Answer:   Regular [1]     Physical Exam   Constitutional: He is oriented to person, place, and time. He appears well-developed and well-nourished. No distress.   HENT:   Head: Normocephalic and atraumatic.   Mouth/Throat: Oropharynx is clear and moist. No oropharyngeal exudate.   Eyes: Conjunctivae are normal. No scleral icterus.   Neck: Neck supple.   Cardiovascular: Normal rate, regular rhythm and intact distal pulses.    No murmur heard.  Pulmonary/Chest: Effort normal and breath sounds normal.  No respiratory distress. He has no wheezes.   Abdominal: Soft. Bowel sounds are normal. He exhibits no distension and no mass. There is tenderness (RUQ pain better today ). There is no rebound.   2 biliary drains in place, bile in collection bags.     Musculoskeletal: He exhibits no edema or tenderness.   Neurological: He is alert and oriented to person, place, and time. No cranial nerve deficit. Coordination normal.   Skin: Skin is warm and dry. No rash noted. No erythema.   Jaundice     Psychiatric: He has a normal mood and affect. His behavior is normal. Judgment normal.   Nursing note and vitals reviewed.      Recent Labs      06/10/18   0028  06/11/18   0004  06/12/18   0002   WBC  15.6*  13.0*  10.3   RBC  3.12*  3.12*  2.93*   HEMOGLOBIN  9.9*  10.2*  9.4*   HEMATOCRIT  30.2*  30.0*  28.1*   MCV  96.8  96.2  95.9   MCH  31.7  32.7  32.1   MCHC  32.8*  34.0  33.5*   RDW  74.1*  72.7*  73.2*   PLATELETCT  343  331  350   MPV  9.5  9.0  9.3     Recent Labs      06/10/18   0028  06/11/18   0004  06/12/18   0002   SODIUM  129*  127*  131*   POTASSIUM  4.1  3.4*  3.4*   CHLORIDE  97  94*  98   CO2  26  23  27   GLUCOSE  94  92  107*   BUN  14  11  12   CREATININE  0.75  0.52  0.68   CALCIUM  8.2*  8.1*  7.6*                      Assessment/Plan     Metastatic cancer (HCC)- (present on admission)   Assessment & Plan    Liver biopsy 6/4  Pathology reports adenocarcinoma favoring upper GI origin  Oncology consulted  GI, Dr Jordan consulted for possible intervention, upper and lower endoscopies today - Dr Christianson at 11am  2 percutaneous drains placed in IR, will continue to trend bilirubin.  Lungs, liver, gallbladder mets          Cholecystitis, acute- (present on admission)   Assessment & Plan    Ultrasound findings concerning for acalculous cholecystitis in the setting metastatic cancer, exam not consistent with acute rocio.  Minimal pain  Surgery Dr Roth was consulted, no surgical intervention recommended at  this time          Sepsis (HCC)- (present on admission)   Assessment & Plan    This is sepsis (without associated acute organ dysfunction).   Resolved          Leukocytosis   Assessment & Plan    Likely secondary to irritation from drains, patient afebrile, complaining of increased pain at the drain site  CT abdomen/pelvis with oral contrast only to eval for source of infection   Completed 7 days of zosyn yesterday  Rocephin until CT abdomen/pelvis resulted - if no signs of infection then will discontinue.          UTI (urinary tract infection)   Assessment & Plan    Nitrate positive, LE positive  Zosyn x 7 days  completed          Elevated transaminase level- (present on admission)   Assessment & Plan    Along with hyperbilirubinemia  Secondary to metastatic cancer  Improving        Elevated bilirubin- (present on admission)   Assessment & Plan    Likely secondary to obstruction from metastatic cancer  Continue to monitor CMP  Post 2 perc biliary stents - having pain at insertion sites - improved following bowel prep.  We will need continued drain care on discharge to home  Follow up with oncology and GI for further recommendations  Nursing to educate on drain care and home health ordered          Portal vein thrombosis- (present on admission)   Assessment & Plan    Secondary to metastatic cancer  Hold lovenox secondary to intervention, will resume if appropriate by oncology.          Quality-Core Measures   Reviewed items::  Labs reviewed, Medications reviewed and Radiology images reviewed  Galvez catheter::  No Galvez  DVT prophylaxis - mechanical:  SCDs  Antibiotics:  Treating active infection/contamination beyond 24 hours perioperative coverage

## 2018-06-12 NOTE — FACE TO FACE
Face to Face Supporting Documentation - Home Health    The encounter with this patient was in whole or in part the primary reason for home health admission.    Date of encounter:   Patient:                    MRN:                       YOB: 2018  Eliceo Vyas  [id number]  1950     Home health to see patient for:  Skilled Nursing care for assessment, interventions & education, Wound Care, Physical Therapy evaluation and treatment and Occupational therapy evaluation and treatment    Skilled need for:  Exacerbation of Chronic Disease State metastatic cancer    Skilled nursing interventions to include:  Wound Care and Line/Drain/Airway education and care    Homebound status evidenced by:  Need the aid of supportive devices such as crutches, canes, wheelchairs or walkers or Needs the assistance of another person in order to leave the home. Leaving home requires a considerable and taxing effort. There is a normal inability to leave the home.    Community Physician to provide follow up care: No primary care provider on file.     Optional Interventions? No      I certify the face to face encounter for this home health care referral meets the CMS requirements and the encounter/clinical assessment with the patient was, in whole, or in part, for the medical condition(s) listed above, which is the primary reason for home health care. Based on my clinical findings: the service(s) are medically necessary, support the need for home health care, and the homebound criteria are met.  I certify that this patient has had a face to face encounter by myself.  Subha Calvert D.O. - NPI: [id number]

## 2018-06-12 NOTE — CARE PLAN
Problem: Communication  Goal: The ability to communicate needs accurately and effectively will improve    Intervention: Educate patient and significant other/support system about the plan of care, procedures, treatments, medications and allow for questions  Plan of care discussed with patient and spouse, with all agreeable.      Problem: Pain Management  Goal: Pain level will decrease to patient's comfort goal    Intervention: Educate and implement non-pharmacologic comfort measures. Examples: relaxation, distration, play therapy, activity therapy, massage, etc.  Patient educated on non-pharmacological pain relief methods.

## 2018-06-13 NOTE — DISCHARGE PLANNING
Anticipated Discharge Disposition: Home    Action: Met with pt and his spouse Eric at bedside this AM to re-visit HH.  Provided education on HH services and encouraged pt and spouse to consider this service as an additional support for pt/spouse during this difficult time.  Pt and spouse both declined stating they feel pt/spouse can care for pt.  They will take the HH choice list with them in the event they change their minds.  SW indicated if they decide they want HHC they will need to f/u with pt's outpatient PCP and this must be done within 30 days of hospitalization.  Spouse Francisca requested assistance setting up a PCP for pt.    Provided update to Dr. Calvert.  Spoke with bedside RN who is contacting  for primary care appointment for pt.  Bedside RN will provide teaching to spouse/pt on drain care.  No other needs at this time.           Barriers to Discharge: None    Plan: Discharging home with spouse this date.

## 2018-06-13 NOTE — DISCHARGE INSTRUCTIONS
Discharge Instructions      Cholecystitis  Cholecystitis is inflammation of the gallbladder. It is often called a gallbladder attack. The gallbladder is a pear-shaped organ that lies beneath the liver on the right side of the body. The gallbladder stores bile, which is a fluid that helps the body to digest fats. If bile builds up in your gallbladder, your gallbladder becomes inflamed. This condition may occur suddenly (be acute). Repeat episodes of acute cholecystitis or prolonged episodes may lead to a long-term (chronic) condition. Cholecystitis is serious and it requires treatment.  What are the causes?  The most common cause of this condition is gallstones. Gallstones can block the tube (duct) that carries bile out of your gallbladder. This causes bile to build up. Other causes of this condition include:  · Damage to the gallbladder due to a decrease in blood flow.  · Infections in the bile ducts.  · Scars or kinks in the bile ducts.  · Tumors in the liver, pancreas, or gallbladder.  What increases the risk?  This condition is more likely to develop in:  · People who have sickle cell disease.  · People who take birth control pills or use estrogen.  · People who have alcoholic liver disease.  · People who have liver cirrhosis.  · People who have their nutrition delivered through a vein (parenteral nutrition).  · People who do not eat or drink (do fasting) for a long period of time.  · People who are obese.  · People who have rapid weight loss.  · People who are pregnant.  · People who have increased triglyceride levels.  · People who have pancreatitis.  What are the signs or symptoms?  Symptoms of this condition include:  · Abdominal pain, especially in the upper right area of the abdomen.  · Abdominal tenderness or bloating.  · Nausea.  · Vomiting.  · Fever.  · Chills.  · Yellowing of the skin and the whites of the eyes (jaundice).  How is this diagnosed?  This condition is diagnosed with a medical history and  physical exam. You may also have other tests, including:  · Imaging tests, such as:  ¨ An ultrasound of the gallbladder.  ¨ A CT scan of the abdomen.  ¨ A gallbladder nuclear scan (HIDA scan). This scan allows your health care provider to see the bile moving from your liver to your gallbladder and to your small intestine.  ¨ MRI.  · Blood tests, such as:  ¨ A complete blood count, because the white blood cell count may be higher than normal.  ¨ Liver function tests, because some levels may be higher than normal with certain types of gallstones.  How is this treated?  Treatment may include:  · Fasting for a certain amount of time.  · IV fluids.  · Medicine to treat pain or vomiting.  · Antibiotic medicine.  · Surgery to remove your gallbladder (cholecystectomy). This may happen immediately or at a later time.  Follow these instructions at home:  Home care will depend on your treatment. In general:  · Take over-the-counter and prescription medicines only as told by your health care provider.  · If you were prescribed an antibiotic medicine, take it as told by your health care provider. Do not stop taking the antibiotic even if you start to feel better.  · Follow instructions from your health care provider about what to eat or drink. When you are allowed to eat, avoid eating or drinking anything that triggers your symptoms.  · Keep all follow-up visits as told by your health care provider. This is important.  Contact a health care provider if:  · Your pain is not controlled with medicine.  · You have a fever.  Get help right away if:  · Your pain moves to another part of your abdomen or to your back.  · You continue to have symptoms or you develop new symptoms even with treatment.  This information is not intended to replace advice given to you by your health care provider. Make sure you discuss any questions you have with your health care provider.  Document Released: 12/18/2006 Document Revised: 04/27/2017 Document  Reviewed: 03/30/2016  [employer] Patient Education © 2017 [employer]            Discharged to home by car with relative. Discharged via wheelchair, hospital escort: Yes.  Special equipment needed: Not Applicable    Be sure to schedule a follow-up appointment with your primary care doctor or any specialists as instructed.     Discharge Plan:   Diet Plan: Discussed  Activity Level: Discussed  Confirmed Follow up Appointment: Appointment Scheduled  Confirmed Symptoms Management: Discussed  Medication Reconciliation Updated: Yes  Pneumococcal Vaccine Administered/Refused: Not given - Patient refused pneumococcal vaccine  Influenza Vaccine Indication: Patient Refuses    I understand that a diet low in cholesterol, fat, and sodium is recommended for good health. Unless I have been given specific instructions below for another diet, I accept this instruction as my diet prescription.   Other diet: regular    Special Instructions: None    · Is patient discharged on Warfarin / Coumadin?   No     Depression / Suicide Risk    As you are discharged from this [facility] facility, it is important to learn how to keep safe from harming yourself.    Recognize the warning signs:  · Abrupt changes in personality, positive or negative- including increase in energy   · Giving away possessions  · Change in eating patterns- significant weight changes-  positive or negative  · Change in sleeping patterns- unable to sleep or sleeping all the time   · Unwillingness or inability to communicate  · Depression  · Unusual sadness, discouragement and loneliness  · Talk of wanting to die  · Neglect of personal appearance   · Rebelliousness- reckless behavior  · Withdrawal from people/activities they love  · Confusion- inability to concentrate     If you or a loved one observes any of these behaviors or has concerns about self-harm, here's what you can do:  · Talk about it- your feelings and reasons for harming yourself  · Remove any  means that you might use to hurt yourself (examples: pills, rope, extension cords, firearm)  · Get professional help from the community (Mental Health, Substance Abuse, psychological counseling)  · Do not be alone:Call your Safe Contact- someone whom you trust who will be there for you.  · Call your local CRISIS HOTLINE 884-2172 or 825-424-0202  · Call your local Children's Mobile Crisis Response Team [place] (034) 529-4587 or www.[employer]ler  · Call the toll free National Suicide Prevention Hotlines   · National Suicide Prevention Lifeline 051-998-EVZX (2301)  · National Hope Line Network 800-SUICIDE (929-0756)

## 2018-06-13 NOTE — PROGRESS NOTES
Assume care of patient @ 1900 from MARIAELENA Peguero. Patient is A&Ox4, upself, denies pain, nausea/vomiting. Patient 's wife is at bedside. Plan of care discussed with both patient and spouse, possible d/c tomorrow. Patient belongings at bedside, all needs met at this time, will continue to round Q2.

## 2018-06-13 NOTE — DISCHARGE SUMMARY
Discharge Summary    CHIEF COMPLAINT ON ADMISSION  Chief Complaint   Patient presents with   • Weakness   • Weight Loss       Reason for Admission  Other     Admission Date  6/2/2018    CODE STATUS  Full Code    HPI & HOSPITAL COURSE  This is a 67 y.o. male here with  right upper quadrant pain and CT consistent with metastatic cancer of unclear primary source. He was noted to have transaminitis as well as hyperbilirubinemia on admission with concern for possible acute cholecystitis. Surgery was consulted for evaluation, however exam was not consistent with acute cholecystitis. As per Dr. Roth, no surgical intervention recommended. He was prophylactically started on IV antibiotics for associated abdominal source. He had completed a seven-day course of IV antibiotics with Zosyn.    He was seen by GI as well as oncology during this admission for metastatic cancer. Pathology was obtained which revealed adenocarcinoma. He did require stent and cholecystostomy tube placed by IR for palliative relief. Patient's transaminitis as well as bilirubin had improved with stent placement. Patient's pain has resolved. He did have an endoscopy as well as colonoscopy by Dr. 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 This information was relayed to Dr. Rossi who advises the patient to follow-up as an outpatient with drain in place.    Given resolution of symptoms, he has been cleared for discharge to home with ongoing drain care. Due to advanced disease, should drain not provide appropriate relief, patient may need hospice evaluation per oncology. He is advised to follow up with oncology in 2 weeks and repeat CMP. These findings were relayed to the patient who understands recommendations.    Patient's wife and the patient has been educated on drain tube care. We have offered additional support with home health assistance. However patient's wife is confident with drain care and  are refusing home health at this time. He is tolerating a regular diet with no further symptoms of pain or nausea.       Therefore, he is discharged in good and stable condition to home with close outpatient follow-up.    The patient met 2-midnight criteria for an inpatient stay at the time of discharge.    Discharge Date  June 13, 2018    FOLLOW UP ITEMS POST DISCHARGE  Primary care provider in one week  CMP in 10 days  Dr. Rossi, oncology in 2 weeks  GI as needed    DISCHARGE DIAGNOSES  Active Problems:    Metastatic cancer (HCC) POA: Yes    Sepsis (HCC) POA: Yes    Cholecystitis, acute POA: Yes    Portal vein thrombosis POA: Yes    Elevated bilirubin POA: Yes    Elevated transaminase level POA: Yes    UTI (urinary tract infection) POA: Unknown    Leukocytosis POA: Unknown  Resolved Problems:    * No resolved hospital problems. *      FOLLOW UP  Future Appointments  Date Time Provider Department Center   6/14/2018 10:00 AM CARE MANAGER [facility]   6/20/2018 9:00 AM Anna Lewis M.D. [facility]   7/23/2018 8:20 AM Girish Ruffin M.D. [id number]     Jan Rossi M.D.  [address] [address]  [phone]      [facility]  left a message for your provider regarding need for a follow up appointment. Please call their office directly if you do not hear from them with in 2 days. Thank you       MEDICATIONS ON DISCHARGE     Medication List      START taking these medications      Instructions   diphenhydrAMINE 25 MG Tabs  Commonly known as:  BENADRYL   Take 1 tablet by mouth every 6 hours as needed for Itching.  Dose:  25 mg     omeprazole 20 MG delayed-release capsule  Commonly known as:  PRILOSEC   Take 1 Cap by mouth 2 times a day.  Dose:  20 mg     ondansetron 4 MG Tbdp  Commonly known as:  ZOFRAN ODT   Take 1 Tab by mouth every four hours as needed (give PO if IV route is unavailable. May give per feeding tube.).  Dose:  4 mg            Allergies  No Known  Allergies    DIET  Orders Placed This Encounter   Procedures   • DIET ORDER     Standing Status:   Standing     Number of Occurrences:   1     Order Specific Question:   Diet:     Answer:   Regular [1]       ACTIVITY  As tolerated.  Weight bearing as tolerated    CONSULTATIONS  Oncology  IR  GI  Surgery    PROCEDURES  Liver mass biopsy  Left internal-external biliary drain placement and right external biliary drain placement  EGD  Colonoscopy    LABORATORY  Lab Results   Component Value Date    SODIUM 133 (L) 06/13/2018    POTASSIUM 3.7 06/13/2018    CHLORIDE 97 06/13/2018    CO2 26 06/13/2018    GLUCOSE 116 (H) 06/13/2018    BUN 11 06/13/2018    CREATININE 0.73 06/13/2018        Lab Results   Component Value Date    WBC 11.1 (H) 06/13/2018    HEMOGLOBIN 10.1 (L) 06/13/2018    HEMATOCRIT 30.0 (L) 06/13/2018    PLATELETCT 368 06/13/2018        Total time of the discharge process exceeds 45 minutes.

## 2018-06-13 NOTE — PROGRESS NOTES
Pt was given all discharge instructions and prescriptions prior to leaving the hospital. Drain care instructions and demonstration given to the pt and his wife. Pts wife able to demonstrate accurate safe drain care. Pt denied need for any pain medication at the time of discharge. Pt and his wife verbalized understanding all RN and MD instructions.

## 2018-06-14 NOTE — PROGRESS NOTES
6/14/18 10:00 am:  CM post discharge outreach call first attempt.  VM left requesting return call to  at [phone].    6/14/18 12:30 pm:  CM post discharge call second attempt.  MERLINE left requesting return call to  at [phone].

## 2018-06-20 NOTE — PATIENT INSTRUCTIONS
Oncology     Jan Rossi MD   Phone: [phone]     Gastroenterology   Dr. Tre Rodriguez   [facility]   Phone: [phone]

## 2018-06-20 NOTE — PROGRESS NOTES
Subjective:     Eliceo Vyas is a 67 y.o. male who presents for Hospital Follow-up.  Chart and discharge summary reviewed.   Date of discharge 6/13/18.  48- hour post discharge RN call attempted ×2 on 6/14/18 but not completed.  This was documented in the medical record by Digna Espino RN.         HPI: Recently hospitalized for right upper quadrant abdominal pain, hyperbilirubinemia with transaminitis.  CT scan was consistent with metastatic cancer.  He had leukocytosis and was treated for sepsis with IV antibiotics.  Consults included general surgery, GI, and oncology.  Pathology revealed adenocarcinoma.  He received a stent and cholecystostomy tube placed by interventional radiology for palliative relief.  Bilirubin went from 16 on admission to 11.5 by the time of discharge.  He underwent endoscopy and colonoscopy with biopsy which revealed a  tubovillous adenoma with high-grade dysplasia and gastritis.  Oncology follow-up with Dr. Rossi was recommended for 2 weeks after discharge.    Since returning home, patient reports feeling all right but still periodically has right upper quadrant pain.  He has been taking Advil as needed but sometimes feels that he needs something stronger.  He denies nausea or vomiting but notes a poor appetite.    The patient has questions regarding hospitalization or discharge: He and his wife questioned about oncology follow-up.  He does not as yet have an appointment.  The patient denies weakness; denies difficulty taking care of self at home.  Patient reports any taking medications.  He was prescribed omeprazole but apparently did not realize this and did not pick it up.  He is not requiring Zofran for nausea or diphenhydramine for itching.      Allergies:   Patient has no known allergies.    Social History:  Social History   Substance Use Topics   • Smoking status: Never Smoker   • Smokeless tobacco: Never Used   • Alcohol use Not on file        ROS:    Constitutional:  Denies  fever, chills, night sweats, fatigue or malaise  HENT: Denies head congestion, ear pain or drainage, decreased hearing, sore throat  Eyes: Denies visual changes, eye drainage or redness, eye pain  Neck: Denies pain, swollen glands, decreased range of motion  Lungs: Denies shortness of breath, wheezing, cough  Cardiovascular: Denies chest pain, orthopnea, lower extremity edema, palpitations  Abdominal: Denies change in bowel or bladder habits, nausea or vomiting  Musculoskeletal: Denies back pain, joint pains, decreased range of motion  Endocrine: Denies unexplained weight changes, heat or cold intolerance, hair loss, polyuria or polydipsia  Neurological: Denies dizziness, headache, confusion, focal weakness or numbness, memory loss  Psychiatric: Denies depression, anxiety, insomnia       Objective:     Blood pressure 108/60, pulse 88, temperature 36.6 °C (97.8 °F), resp. rate 16, height 1.829 m (6'), weight 95.2 kg (209 lb 12.8 oz), SpO2 98 %.     Physical Exam:    GEN:  Alert, oriented, in no distress  HEENT:  PERRLA, EOMI, scleral icterus present  LUNGS:  Clear to auscultation without rales, rhonci, or wheezes.  CV:  Heart RRR without murmurs or S3 or S4; peripheral pulses intact.  ABD:  Soft, minimal tenderness to palpation, nondistended, to biliary drainage tubes in place and clean and dry. Bile is present in collection bags.  EXT:  Without cyanosis, clubbing, or edema.  NEURO:  Cranial nerves II through XII intact.  Motor function and sensation intact.  SKIN: No rashes or suspicious lesions.  PSYCH:  Behavior is appropriate.      Assessment and Plan:     1. Hospital follow-up:   Hospitalization and results reviewed with patient. High risk conditions requiring teaching or care coordination were identified and addressed.The patient demonstrate understanding of admission and underlying conditions. The patient understands discharge instructions and when to seek medical attention. Medications reviewed including  instructions regarding high risk medications, dosing and side effects.    The patient is able to safely adhere to ADL/IADL, treatment and medication regimen, self-manage of high-risk conditions? Yes   The patient requires physical therapy/home health/DME referral? Yes   The patient requires referral to care coordination/behavioral health/social work?  No   Patient requires referral for pharmacy consult? No   Advance directive/POLST on file?  No   Required counseling on advance directive?  Yes  , Counseling provided and scheduled for advanced directive workshops provided.  - REFERRAL TO HOME HEALTH    2. Metastatic cancer (HCC)  -We called Dr. Rossi's office to make sure he is scheduled for follow-up.  They will call him.  - REFERRAL TO HOME HEALTH    3. Sepsis, due to unspecified organism (HCC)  -Resolved    4. Hyperbilirubinemia  -Secondary to metastatic cancer.  Improved with biliary drain    5. Transaminitis  -Secondary to metastatic cancer    6. Cancer associated pain    - HYDROcodone-acetaminophen (NORCO) 5-325 MG Tab per tablet; Take 1-2 Tabs by mouth every 6 hours as needed for up to 7 days.  Dispense: 20 Tab; Refill: 0  - CONSENT FOR OPIATE PRESCRIPTION        Medication Reconciliation  Medication list at end of encounter:   Current Outpatient Prescriptions   Medication Sig Dispense Refill   • ondansetron (ZOFRAN ODT) 4 MG TABLET DISPERSIBLE Take 1 Tab by mouth every four hours as needed (give PO if IV route is unavailable. May give per feeding tube.). 10 Tab 0   • diphenhydrAMINE (BENADRYL) 25 MG Tab Take 1 tablet by mouth every 6 hours as needed for Itching. 30 Tab 0   • omeprazole (PRILOSEC) 20 MG delayed-release capsule Take 1 Cap by mouth 2 times a day. 60 Cap 0     No current facility-administered medications for this visit.        Primary care follow-up:    Recommended followup:  With new Pcp    Future Appointments       Provider Department [place]    7/23/2018 8:20 AM [facility]  [address]          Patient Instruction  Patient provided with educational material on discharge diagnosis and management of symptoms/red flags. Patient instructed to keep follow-up appointments and to bring written questions and and actual medications to each office visit. Patient instructed to call PCP/specialist with any problems/questions/concerns. Patient verbalizes understanding and has no further questions at this time.    Face-to-face transitional care management services with high medical decision complexity were provided.

## 2018-06-22 ENCOUNTER — APPOINTMENT (OUTPATIENT)
Dept: RADIOLOGY | Facility: MEDICAL CENTER | Age: 68
DRG: 871 | End: 2018-06-22
Attending: INTERNAL MEDICINE
Payer: MEDICARE

## 2018-06-22 ENCOUNTER — APPOINTMENT (OUTPATIENT)
Dept: RADIOLOGY | Facility: MEDICAL CENTER | Age: 68
DRG: 871 | End: 2018-06-22
Attending: FAMILY MEDICINE
Payer: MEDICARE

## 2018-06-22 PROBLEM — E87.20 LACTIC ACIDOSIS: Status: ACTIVE | Noted: 2018-06-22

## 2018-06-22 PROBLEM — J96.00 ACUTE RESPIRATORY FAILURE (HCC): Status: ACTIVE | Noted: 2018-06-22

## 2018-06-22 PROBLEM — K72.90 LIVER FAILURE (HCC): Status: ACTIVE | Noted: 2018-06-22

## 2018-06-22 PROBLEM — K83.1 OBSTRUCTIVE JAUNDICE: Status: ACTIVE | Noted: 2018-06-22

## 2018-06-22 PROBLEM — N17.9 ACUTE KIDNEY INJURY (HCC): Status: ACTIVE | Noted: 2018-06-22

## 2018-06-22 PROBLEM — R65.21 SEPTIC SHOCK (HCC): Status: ACTIVE | Noted: 2018-06-22

## 2018-06-22 PROBLEM — R79.89 ELEVATED LFTS: Status: ACTIVE | Noted: 2018-06-22

## 2018-06-22 PROBLEM — E16.2 HYPOGLYCEMIA: Status: ACTIVE | Noted: 2018-06-22

## 2018-06-22 PROBLEM — A41.9 SEPTIC SHOCK (HCC): Status: ACTIVE | Noted: 2018-06-22

## 2018-06-22 LAB
GLUCOSE BLD-MCNC: 14 MG/DL (ref 65–99)
GLUCOSE BLD-MCNC: 74 MG/DL (ref 65–99)

## 2018-06-22 ASSESSMENT — ENCOUNTER SYMPTOMS
DEPRESSION: 0
DIZZINESS: 0
BLURRED VISION: 0
SHORTNESS OF BREATH: 1
NECK PAIN: 0
PALPITATIONS: 0
HEADACHES: 0
CHILLS: 0
BRUISES/BLEEDS EASILY: 0
DOUBLE VISION: 0
MYALGIAS: 0
NAUSEA: 0
WEIGHT LOSS: 1
HEARTBURN: 0
ABDOMINAL PAIN: 1
FEVER: 0

## 2018-06-22 NOTE — CONSULTS
Pulmonary & Critical Care Consult Note    DATE OF CONSULTATION:  6/21/2018     REFERRING PHYSICIAN:  Jael Brooks M.D.     CONSULTANT:  Austin Diaz DO     REASON FOR CONSULTATION:  VDRF, sepsis     HISTORY OF PRESENT ILLNESS: History limited by intubation and sedation. Hx obtained from chart and wife at bedside. 67 yom pmhx of widely metastatic cancer: tubulovillous adenoma vs upper GI CA, diagnosed during a recent hospitalization from 6/2/18-6/13/18, not currently on chemo; presents with increasing severe shortness of breath starting last night and gradually worsening, this is associated with a cough productive of yellow sputum. He was found hypoxic by EMS and was placed on BiPAP in the ER, unfortunately he did not tolerate BiPAP and was subsequently intubated. Labs showed: WBC 16.6, Hg 11.6, Na 132, CO2 12, Glucose 57, Cr 3.13, , , , tBili 15, AG 24, INR 1.81, Trop 0.18, lactate 16.2.     PAST MEDICAL HISTORY:   History reviewed. No pertinent past medical history.     PAST SURGICAL HISTORY:    Past Surgical History:   Procedure Laterality Date   • GASTROSCOPY-ENDO N/A 6/11/2018    Procedure: GASTROSCOPY-ENDO;  Surgeon: Eliot Arredondo D.O.;  Location: [facility];  Service: Gastroenterology   • COLONOSCOPY - ENDO N/A 6/11/2018    Procedure: COLONOSCOPY - ENDO;  Surgeon: Eliot Arredondo D.O.;  Location: [facility];  Service: Gastroenterology        ALLERGIES:    Patient has no known allergies.     MEDICATIONS PRIOR TO ADMISSION:   No current facility-administered medications on file prior to encounter.      Current Outpatient Prescriptions on File Prior to Encounter   Medication Sig Dispense Refill   • HYDROcodone-acetaminophen (NORCO) 5-325 MG Tab per tablet Take 1-2 Tabs by mouth every 6 hours as needed for up to 7 days. 20 Tab 0   • ondansetron (ZOFRAN ODT) 4 MG TABLET DISPERSIBLE Take 1 Tab by mouth every four hours as needed (give PO if IV  route is unavailable. May give per feeding tube.). 10 Tab 0   • diphenhydrAMINE (BENADRYL) 25 MG Tab Take 1 tablet by mouth every 6 hours as needed for Itching. 30 Tab 0   • omeprazole (PRILOSEC) 20 MG delayed-release capsule Take 1 Cap by mouth 2 times a day. 60 Cap 0       SOCIAL HISTORY:    Social History     Social History   • Marital status: Unknown     Spouse name: N/A   • Number of children: N/A   • Years of education: N/A     Occupational History   • Not on file.     Social History Main Topics   • Smoking status: Never Smoker   • Smokeless tobacco: Never Used   • Alcohol use Not on file   • Drug use: Unknown   • Sexual activity: Not on file     Other Topics Concern   • Not on file     Social History Narrative   • No narrative on file       FAMILY HISTORY:   Unobtainable 2/2 intubated status     REVIEW OF SYSTEMS:    Unobtainable 2/2 intubated status    PHYSICAL EXAMINATION:  BP (!) 88/57   Pulse (!) 127   Temp 36 °C (96.8 °F)   Resp (!) 29   Ht 1.829 m (6')   Wt 101 kg (222 lb 10.6 oz)   SpO2 99%   BMI 30.20 kg/m²   GENERAL:  Well developed, well nourished male in obvious distress  HEENT:  NC, AT, EOMI, PERRL, external ears and nose normal, ETT in place  NECK:  Supple, no JVD  PULM:   Coarse rale b/l, diminished   CVS:  Tachycardia, regular rhythm  ABDOMEN:  Obese, 2 drains in RUQ with bilious output  EXTREMITIES:  1-2+ pitting edema  SKIN:  Cool, dry  NEURO: sedated    LABORATORY DATA:    Lab Results   Component Value Date/Time    WBC 16.6 (H) 06/21/2018 06:07 PM    RBC 3.54 (L) 06/21/2018 06:07 PM    HEMOGLOBIN 11.6 (L) 06/21/2018 06:07 PM    HEMATOCRIT 36.6 (L) 06/21/2018 06:07 PM    .4 (H) 06/21/2018 06:07 PM    MCH 32.8 06/21/2018 06:07 PM    MCHC 31.7 (L) 06/21/2018 06:07 PM    MPV 10.4 06/21/2018 06:07 PM    NEUTSPOLYS 62.30 06/21/2018 06:07 PM    LYMPHOCYTES 4.40 (L) 06/21/2018 06:07 PM    MONOCYTES 0.00 06/21/2018 06:07 PM    EOSINOPHILS 0.00 06/21/2018 06:07 PM    BASOPHILS 0.00  06/21/2018 06:07 PM    ANISOCYTOSIS 2+ 06/21/2018 06:07 PM      Lab Results   Component Value Date/Time    SODIUM 132 (L) 06/21/2018 06:07 PM    POTASSIUM 4.0 06/21/2018 06:07 PM    CHLORIDE 96 06/21/2018 06:07 PM    CO2 12 (L) 06/21/2018 06:07 PM    GLUCOSE 57 (L) 06/21/2018 06:07 PM    BUN 33 (H) 06/21/2018 06:07 PM    CREATININE 3.13 (H) 06/21/2018 06:07 PM      Lab Results   Component Value Date/Time    PROTHROMBTM 20.7 (H) 06/21/2018 06:07 PM    INR 1.81 (H) 06/21/2018 06:07 PM         IMAGING:   CXR (personally reviewed)   DX-CHEST-PORTABLE (1 VIEW)   Final Result      1.  There is no interval change in multiple bilateral pulmonary nodules and masses consistent with metastases.      NM-LUNG PERFUSION IMAGING    (Results Pending)      ASSESSMENT/PLAN:  Acute hypoxic respiratory failure   - intubate 6/2/18   - RT/O2 protocols   - Daily and PRN ABGs   - Titration of ventilator therapy based on ABGs and patient's status   - Sedation as tolerated/indicated   - Daily CXR   - HOB >30 degrees and peridex for VAP prevention   - Pepcid for GI prophylaxis   - SAT/SBT when able (ABCDEF Bundle)   - Early mobility    Septic shock   - Source likely biliary given drains vs pulmonary   - sepsis protocol   - source targeted antibiotics: zosyn, vanco   - 30 mL/kg crystalloid bolus provided   - PRN IVF bolus to maintain MAP >65 mmHg   - Pressors if needed to maintain MAP >65 mmHg   - blood, respiratory and urine cultures   - lactate every 4 hours until normalized or downtrending    Hepatic failure   - 2/2 metastasis and sepsis   - with obstructive jaundice and transaminitis   - CT abdomen   - IV zosyn    Metastatic Cancer   - primary colon tubulovillous adenoma vs other   - mets to liver and innumerable to lung   - poor prognosis, palliative care    Metabolic acidosis   - continue resuscitation   - bicarb when needed    RUSSELL   - 2/2 sepsis   - IVF   - renal dose meds, avoid nephrotoxins   - urine studies    Anemia   - mild, not  currently bleeding   - monitor    Hyponatremia   - mild, monitor    Prophylaxis: heparin    Patient is critically ill at this time.  I have spent 78 minutes examining this patient, all lab data, x-ray, and discussion with RN, RT, ED physician, family, pharmacy. Critical care time: 78 min. No time overlap. Procedures not included in time. Thank you for asking me to consult on the patient.  I appreciate the opportunity to assist in their care and will follow along closely with you.    Austin Diaz, DO  Critical Care Medicine    Please note that this dictation was created using voice recognition software. The accuracy of the dictation is limited to the abilities of the software.I have made every reasonable attempt to correct obvious errors, but I expect that there are errors of grammar and possibly content that I did not discover before finalizing the note.       Addendum:  Prior to transport to the ICU the patient had a cardiac arrest, see separate note for details.

## 2018-06-22 NOTE — ASSESSMENT & PLAN NOTE
Patient has metastatic cancer to the lungs and liver. Had colonoscopy recently that showed tubulovillous adenoma with high-grade dysplasia. Unknown primary.

## 2018-06-22 NOTE — ED NOTES
Med rec complete per pt's family at bedside  Allergies reviewed - NKDA  No oral ABX in last month  Pt did receive IV ABX during last admit, though (6/2-6/13)

## 2018-06-22 NOTE — ED NOTES
Admitting MD at bedside, pt attempting to take off BiPap mask.  Pt informed this is vital to him improving at this time, pt insisting on taking off BiPap mask.  Pt placed back on NRB, respiratory called, at bedside at this time.  Dr. Hernandez notified.

## 2018-06-22 NOTE — PROGRESS NOTES
ICU RN to bedside at 2225 to ER Anjel rm 21 to transport patient to ICU. Before transport, pt's BP dropped. ED RN at bedside with ICU RN. Bolus of fluids were being given. MD called to bedside with Pharmacy. Levophed started. Patient lost pulse at 2339, code blue was called and CPR started. Multiple RNs at bedside, Techs, Pharmacist, Respiratory Therapist, and 2 MDs. ROSC was obtained, however pt lost pulse multiple times. A Bicarb gtt was started and an epi gtt ordered, in addition to the Levophed gtt running. During code, Dr. Diaz came to bedside to assist. After a total of 5 ROSCs from CPR, patient lost pulse again and CPR was resumed. After several minutes of CPR, MD pronounced patient's death at 2345. Family at bedside, social work at bedside to assist.  called for spiritual support.

## 2018-06-22 NOTE — ASSESSMENT & PLAN NOTE
Patient has diffuse prostatic disease in the lungs seen on imaging. Now with acute respiratory failure. We'll be intubated and placed in the intensive care unit. Very poor prognosis with high mortality expected.

## 2018-06-22 NOTE — ED NOTES
Pt family updated on POC and pt status at this time.  Pt sedated and intubated at this time, vitals stable.

## 2018-06-22 NOTE — ED NOTES
Pt reporting increased difficulty in WOB and tripoding again following breathing treatment, oxygen saturation dropped to 86% on oxymask; pt placed on nonrebreather at 15L. ERP notified.

## 2018-06-22 NOTE — DISCHARGE PLANNING
Medical Social Work     Referral: Code Blue    HALEY responded to a code blue. HALEY met with the pt family outside of the pt room while medical staff preformed CPR. HALEY provided the pt family with emotional support. Dr. Diaz updated the pt family in the [place] and advised them that they are still preforming CPR but that things are not looking good for the pt. Dr. Diaz invited the family to the room while they were preforming CPS the family looked at the medical staff for a short time then went back to there seats in the [place]. HALEY provided tissues and emotional support.     OLI was called at 2345. The pt family sat by him at bedside. The family requested SW call a rachelle. HALEY called the rachelle on call and he stated he would be to [facility] in about 35 minutes. HALEY updated the RN and the family about the rachelle is on his way. .     HALEY provided the pt family with the difficult time packet and provided emotional support.     Plan: HALEY will remain available for pt and family support.

## 2018-06-22 NOTE — PROGRESS NOTES
Pulmonary/Critical Care Medicine   Progress Note    Date of service: 6/21/2018  Time: 23:15    Arrived at patient's room with recent ROSC. Hypoglycemia with D50 given x3. Initial CPR lead by Dr. Rowe.  taken over by myself. The patient was given multiple amps of sodium bicarbonate to attempt to correct severe metabolic acidosis but subsequently became more hypotensive then had a repeat cardiac arrest, ACLS guidelines followed, Repeat ABG with continued worsening acidosis despite bicarb. Patient maxed on levo and epi gtts. ROSC temporarily obtained multiple times (total of 5) with continued bicarb pushes and ACLS measures. Family was brought to the bedside at 23:44 and subsequently requested efforts be ceased at 23:46, bedside echo showed no cardiac movement and no pulse palpated. TOD called by myself at 23:46.    Family returned to the bedside and pastoral services called.    Cause of death:  Cardiac arrest   - due to  Hypotension   -due to  Severe metabolic acidosis   -due to   Sepsis and hepatic failure    [id number]

## 2018-06-22 NOTE — ASSESSMENT & PLAN NOTE
This is severe sepsis with the following associated acute organ dysfunction(s): acute kidney failure, acute respiratory failure, metabolic/septic encephalopathy, hepatic failure.   Started on broad-spectrum IV antibiotics along with IV fluids. Patient is in septic shock. Started on pressors. Poor prognosis and high mortality expected.

## 2018-06-22 NOTE — FLOWSHEET NOTE
Cardiopulmonary Resuscitation    Intubation assist performed y  Positive Color Change on EZCap? yes  EtCO2 mmH  Difficult Intubation/Number of attempts no  Evidence of aspiration no     Booth Vent Mode: APVCMV (18)     Rate (breaths/min): 14 (18)  Vt Target (mL): 470 (18)  FiO2: 50 (18)  PEEP/CPAP: 8 (18)       Events/Summary/Plan: (P) pt  (18)    Pt  and 3 rounds of CPR was preformed.

## 2018-06-22 NOTE — ED TRIAGE NOTES
".Eliceo Vyas  .67 y.o.  .  Chief Complaint   Patient presents with   • ALOC     A&O X4 for nurse, per EMS unable to answer certain questions PTA   • Shortness of Breath     84% RA when EMS arrived       Pt BIBA for SOB increasing, 84% RA upon EMS arrival to house; placed on 10L oxygen via oxymask upon arrival to ED room for increased difficulty w/ WOB and tripoding, ALOC per EMS (pt A&O X4 for nurse); pt has obvious jaundice on assessment and colostomy placed 3 days ago per pt and family. Family states pt was told he possibly has cancer but states it's unk what kind and recently seen for \"liver problems\", but are poor historians. Sepsis Score 6. Charge RN notified and protocol initiated. Pt hypotensive at 88/57 upon arrival to room.     BP (!) 88/57   Pulse (!) 124   Temp 36 °C (96.8 °F)   Resp (!) 32   Ht 1.829 m (6')   Wt 101 kg (222 lb 10.6 oz)   SpO2 95%   BMI 30.20 kg/m²     "

## 2018-06-22 NOTE — ED PROVIDER NOTES
ED Provider Note    CHIEF COMPLAINT  Chief Complaint   Patient presents with   • ALOC     A&O X4 for nurse, per EMS unable to answer certain questions PTA   • Shortness of Breath     84% RA when EMS arrived       HPI  Eliceo Vyas is a 67 y.o. male with recent diagnosis of metastatic cancer involving the lungs, liver, with biliary obstruction, status post stent placement, who presents with complaints of shortness of breath since yesterday. The patient was just recently admitted to the hospital for right upper quadrant abdominal pain and jaundice, and was found to have metastatic cancer of unknown primary origin. Patient had a stent and cholecystostomy tube placed by interventional radiology. His initial bilirubin was 16. He had an endoscopy and colonoscopy which revealed tubovillous had normal with high-grade dysplasia with gastritis.  The patient was discharged home on June 13 about one week ago, and had a follow-up with his PCP Dr. Lewis. He has had increased shortness of breath since last night along with a cough productive of a yellowish sputum at times. He denies any fever, chills, sweats, sore throat, chest pain, or abdominal pain. He has been tolerating food and fluids, denies any nausea, vomit, or diarrhea.    REVIEW OF SYSTEMS  See HPI for further details. All other systems are negative.     PAST MEDICAL HISTORY  No past medical history on file.    FAMILY HISTORY  No family history on file.    SOCIAL HISTORY  Social History     Social History   • Marital status: Unknown     Spouse name: N/A   • Number of children: N/A   • Years of education: N/A     Social History Main Topics   • Smoking status: Never Smoker   • Smokeless tobacco: Never Used   • Alcohol use Not on file   • Drug use: Unknown   • Sexual activity: Not on file     Other Topics Concern   • Not on file     Social History Narrative   • No narrative on file       SURGICAL HISTORY  Past Surgical History:   Procedure Laterality Date   •  GASTROSCOPY-ENDO N/A 6/11/2018    Procedure: GASTROSCOPY-ENDO;  Surgeon: Eliot Arredondo D.O.;  Location: ENDOSCOPY [facility];  Service: Gastroenterology   • COLONOSCOPY - ENDO N/A 6/11/2018    Procedure: COLONOSCOPY - ENDO;  Surgeon: Eliot Arredondo D.O.;  Location: ENDOSCOPY [facility];  Service: Gastroenterology       CURRENT MEDICATIONS  Home Medications    **Home medications have not yet been reviewed for this encounter**         ALLERGIES  No Known Allergies    PHYSICAL EXAM  VITAL SIGNS: BP (!) 88/57   Pulse (!) 122   Temp 36 °C (96.8 °F)   Resp (!) 32   Ht 1.829 m (6')   Wt 101 kg (222 lb 10.6 oz)   SpO2 97%   BMI 30.20 kg/m²   Constitutional: Awake, alert, appears in mild respiratory distress, leaning forward, with use of accessory muscles and intercostal retractions.   HENT: Atraumatic. Bilateral external ears normal, mucous membranes very dry throat nonerythematous without exudates, nose is normal.  Eyes: PERRL, EOMI, conjunctiva moist, noninjected.  Neck: Nontender, Normal range of motion, No nuchal rigidity, No stridor.   Lymphatic: No lymphadenopathy noted.   Cardiovascular: Regular rhythm, tachycardic, rate in the 120s, no murmurs, rubs, gallops.  Thorax & Lungs:  Diminished breath sounds bilaterally, no wheezes, bibasilar rales, there are intercostal retractions and use of accessory muscles.  No chest tenderness.  Abdomen: Bowel sounds normal, Soft, nontender, nondistended, no rebound, guarding, masses. There is a biliary drain tube with drainage of yellowish brownish material.  Back: No CVA or spinal tenderness.  Extremities: Intact distal pulses, there is +1 edema to the lower extremities, No tenderness.   Skin: Warm, Dry, No rashes.   Musculoskeletal: No joint swelling or tenderness.  Neurologic: Alert & oriented x 3, cranial nerves II through XII intact, sensory and motor function normal. No focal deficits.   Psychiatric: Affect anxious, Judgment normal, Mood normal.      EKG  12-lead EKG shows sinus tachycardia, rate of 123, normal intervals, left axis deviation, no acute ST wave elevations or ST depressions, no pathologic Q waves, no evidence for an acute injury or ischemic pattern on my reading, in comparison to previous EKG from June 2, 2018, there are no acute changes other than the rate.        RADIOLOGY/PROCEDURES  DX-CHEST-PORTABLE (1 VIEW)   Final Result      1.  There is no interval change in multiple bilateral pulmonary nodules and masses consistent with metastases.      NM-LUNG PERFUSION IMAGING    (Results Pending)         COURSE & MEDICAL DECISION MAKING  Pertinent Labs & Imaging studies reviewed. (See chart for details)  The patient presents with the above complaints. She appears in mild respiratory distress. He was given a Atrovent/albuterol nebulizer treatment which did not appear to help and made his symptoms worse. He was noted be tripoding, appear to be getting very restless and anxious. He was placed on BiPAP, and appeared to be more anxious. He was given Ativan 1 mg IV. Chest x-ray shows no interval change in multiple bilateral pulmonary nodules and masses consistent with metastases. EKG shows a sinus tachycardia. CBC white count 16,600, hemoglobin 11.6, hematocrit 37, 62% polys, chemistry sodium 132, CO2 12, anion gap 24, BUN 83, creatinine 3.13, glucose 57, SGOT 543, SGPT 121, total bilirubin 15.0, lactic acid 16.2, troponin 0.18, , INR 1.81.  He was given a bolus of normal saline as he was hypotensive, tachycardic, with very dry mucous membranes. The patient  did not tolerate the BiPAP, and took the mask off several times. The patient appeared to be any more anxious and distressed. Decision was made to intubate the patient. Family is in agreement with intubation.  The patient was intubated without significant difficulty. The patient appears septic with elevated lactic acid, acute renal failure, and elevation in his liver function tests. He was  given Zosyn 4.5 g IVPB.  Arterial blood gas shows a pH of 6.809, PCO2 66, PO2 106, with oxygen saturation of 89% on 100% oxygen. Case was discussed with Dr. Brooks for admission.  Dr. Diaz of [facility] was consulted.  Critical care time of 40 minutes not including procedures.    Procedure note: Endotracheal intubation.  The patient was currently on respiratory and cardiac monitors.  He is placed on facemask, and was given 20 mg of etomidate. I was unable to intubate the patient was a patient alone, so he was given succinylcholine 120 mg IV. He was then intubated without difficulty with 8.0 endotracheal tube. Tube was visualized passing to the cords. There is good color change on capnography. Good bilateral sounds auscultated. Good fogging of tube noted. Follow-up chest x-ray has been ordered.    Prior to admission, the patient reported the patient's only dropped his blood pressure into the 70s/50s. He was given additional normal saline. He continued have a low blood pressure, and was started on Levophed continuous infusion. The patient then lost pulses and CPR was started. He received 1 amp of sodium bicarbonate, and 2 rounds of epinephrine with return of pulses. However he then had 4 additional episodes were pulses were lost and the patient received multiple rounds of sodium bicarbonate and epinephrine. He was placed on an epinephrine continuous infusion, and a sodium bicarbonate infusion. After he lost pulses for the 5th time, additional rounds of sodium bicarbonate and epinephrine were given with no return of pulses . No cardiac activity was observed on ultrasound. Family was present and at that point elected to stop further resuscitation attempts.  The code was discontinued at 2345 hrs. Cause of death is respiratory failure secondary to metabolic acidosis and metastatic cancer. The patient  in the emergency department.  Total critical care time of 105 minutes not including procedures.    FINAL IMPRESSION  1.  Respiratory failure  2. sepsis  3. Acute renal failure  4. Metastatic cancer  5. Elevated liver function tests           Electronically signed by: Cameron Hernandez, 6/21/2018 6:32 PM

## 2018-06-22 NOTE — ED NOTES
Pt continues very anxious, not tolerating oxygen, tripod positioning, crackles to bilateral lungs.  Dr. Reynolds to intubate at this time.

## 2018-06-22 NOTE — H&P
Hospital Medicine History and Physical    Date of Service  6/21/2018    Chief Complaint  Chief Complaint   Patient presents with   • ALOC     A&O X4 for nurse, per EMS unable to answer certain questions PTA   • Shortness of Breath     84% RA when EMS arrived       History of Presenting Illness  67 y.o. male with recent diagnosis of metastatic cancer involving the lungs, liver, with biliary obstruction, status post stent placement, who presents with complaints of shortness of breath since yesterday. The patient was just recently admitted to the hospital for right upper quadrant abdominal pain and jaundice, and was found to have metastatic cancer of unknown primary origin. Patient had a stent and cholecystostomy tube placed by interventional radiology. His initial bilirubin was 16. He had an endoscopy and colonoscopy which revealed tubovillous  adenoma with high-grade dysplasia.  The patient was discharged home on June 13 about one week ago, and had a follow-up with his PCP Dr. Lewis. He has had increased shortness of breath since last night along with a cough productive of a yellowish sputum at times. He denies any fever, chills, sweats, sore throat, chest pain, or abdominal pain. He has been tolerating food and fluids, denies any nausea, vomit, or diarrhea.  Primary Care Physician  Pcp Pt States None    Consultants  Critical care    Code Status  Full    Review of Systems  Review of Systems   Constitutional: Positive for malaise/fatigue and weight loss. Negative for chills and fever.   HENT: Negative for hearing loss and tinnitus.    Eyes: Negative for blurred vision and double vision.   Respiratory: Positive for shortness of breath.    Cardiovascular: Negative for chest pain and palpitations.   Gastrointestinal: Positive for abdominal pain. Negative for heartburn and nausea.   Genitourinary: Negative for dysuria.   Musculoskeletal: Negative for myalgias and neck pain.   Skin: Negative for rash.   Neurological:  Negative for dizziness and headaches.   Endo/Heme/Allergies: Negative for environmental allergies. Does not bruise/bleed easily.   Psychiatric/Behavioral: Negative for depression and suicidal ideas.        Past Medical History  Metastatic cancer to the liver and lungs    Surgical History  Past Surgical History:   Procedure Laterality Date   • GASTROSCOPY-ENDO N/A 2018    Procedure: GASTROSCOPY-ENDO;  Surgeon: Eliot Arredondo D.O.;  Location: ENDOSCOPY [facility];  Service: Gastroenterology   • COLONOSCOPY - ENDO N/A 2018    Procedure: COLONOSCOPY - ENDO;  Surgeon: Eliot Arredondo D.O.;  Location: ENDOSCOPY [facility];  Service: Gastroenterology       Medications  No current facility-administered medications on file prior to encounter.      Current Outpatient Prescriptions on File Prior to Encounter   Medication Sig Dispense Refill   • diphenhydrAMINE (BENADRYL) 25 MG Tab Take 1 tablet by mouth every 6 hours as needed for Itching. 30 Tab 0       Family History  Reviewed. Noncontributory.    Social History  Social History   Substance Use Topics   • Smoking status: Never Smoker   • Smokeless tobacco: Never Used   • Alcohol use Not on file       Allergies  No Known Allergies     Physical Exam  Laboratory   Hemodynamics  Temp (24hrs), Av °C (96.8 °F), Min:36 °C (96.8 °F), Max:36 °C (96.8 °F)   Temperature: 36 °C (96.8 °F)  Pulse  Av.7  Min: 68  Max: 153 Heart Rate (Monitored): (!) 117  Blood Pressure : 130/81, NIBP: (!) 71/49      Respiratory  Booth Vent Mode: APVCMV, Rate (breaths/min): 14, PEEP/CPAP: 8, PEEP/CPAP: 8, FiO2: 50   Respiration: 20, Pulse Oximetry: (!) 87 %, O2 Daily Delivery Respiratory : OxyMask     Given By:: Mouthpiece, Work Of Breathing / Effort: Accessory Muscle Use  RUL Breath Sounds: Expiratory Wheezes, RML Breath Sounds: Expiratory Wheezes, RLL Breath Sounds: Clear, AKIKO Breath Sounds: Expiratory Wheezes, LLL Breath Sounds: Clear    Physical Exam    Constitutional: He is oriented to person, place, and time. He appears lethargic. He has a sickly appearance. He appears ill. He appears distressed.   HENT:   Head: Normocephalic and atraumatic.   Eyes: Left eye exhibits no discharge. Scleral icterus is present.   Neck: No tracheal deviation present. No thyromegaly present.   Cardiovascular: Normal rate and regular rhythm.  Exam reveals no friction rub.    Pulmonary/Chest: Accessory muscle usage present. He is in respiratory distress. He has decreased breath sounds. He has rales.   Abdominal: Soft. He exhibits no distension. There is no tenderness.   Musculoskeletal: Normal range of motion. He exhibits no deformity.   Neurological: He is oriented to person, place, and time. He appears lethargic.   Skin: Skin is dry.   Psychiatric: He has a normal mood and affect. His behavior is normal.       Recent Labs      06/21/18   1807   WBC  16.6*   RBC  3.54*   HEMOGLOBIN  11.6*   HEMATOCRIT  36.6*   MCV  103.4*   MCH  32.8   MCHC  31.7*   RDW  75.7*   PLATELETCT  183   MPV  10.4     Recent Labs      06/21/18   1807   SODIUM  132*   POTASSIUM  4.0   CHLORIDE  96   CO2  12*   GLUCOSE  57*   BUN  33*   CREATININE  3.13*   CALCIUM  8.3*     Recent Labs      06/21/18   1807   ALTSGPT  121*   ASTSGOT  543*   ALKPHOSPHAT  425*   TBILIRUBIN  15.0*   GLUCOSE  57*     Recent Labs      06/21/18   1807   APTT  46.9*   INR  1.81*     Recent Labs      06/21/18   1807   BNPBTYPENAT  212*         Lab Results   Component Value Date    TROPONINI 0.18 (H) 06/21/2018     Urinalysis:    Lab Results  Component Value Date/Time   SPECGRAVITY 1.025 06/02/2018 2204   GLUCOSEUR Negative 06/02/2018 2204   KETONES Negative 06/02/2018 2204   NITRITE Positive (A) 06/02/2018 2204   WBCURINE 2-5 (A) 06/02/2018 2204   RBCURINE 10-20 (A) 06/02/2018 2204   BACTERIA Negative 06/02/2018 2204   EPITHELCELL Negative 06/02/2018 2204        Imaging  Reviewed.    Assessment/Plan     I anticipate this patient will  require at least 2 midnight stay for appropriate medical management.    * Acute respiratory failure (HCC)- (present on admission)   Assessment & Plan    Patient has diffuse prostatic disease in the lungs seen on imaging. Now with acute respiratory failure. We'll be intubated and placed in the intensive care unit. Very poor prognosis with high mortality expected.        Septic shock (HCC)- (present on admission)   Assessment & Plan    On pressors. Multi-organ failure. Poor prognosis with expected high mortality.        Sepsis (HCC)- (present on admission)   Assessment & Plan    This is severe sepsis with the following associated acute organ dysfunction(s): acute kidney failure, acute respiratory failure, metabolic/septic encephalopathy, hepatic failure.   Started on broad-spectrum IV antibiotics along with IV fluids. Patient is in septic shock. Started on pressors. Poor prognosis and high mortality expected.        Metastatic cancer (HCC)- (present on admission)   Assessment & Plan    Patient has metastatic cancer to the lungs and liver. Had colonoscopy recently that showed tubulovillous adenoma with high-grade dysplasia. Unknown primary.        Liver failure (HCC)- (present on admission)   Assessment & Plan    Extensive metastatic liver disease and liver shock. Poor prognosis. Very high mortality expected.        Obstructive jaundice- (present on admission)   Assessment & Plan    With extensive metastatic liver disease.        Lactic acidosis- (present on admission)   Assessment & Plan    Severe. Severe sepsis and septic shock. On IV fluids. Poor prognosis.        Hypoglycemia- (present on admission)   Assessment & Plan    Likely due to liver failure.        Elevated LFTs- (present on admission)   Assessment & Plan    Due to metastatic disease of the liver and liver shock. Poor prognosis.        Acute kidney injury (HCC)- (present on admission)   Assessment & Plan    Avoid nephrotoxins. Renal dose all medications.         Leukocytosis- (present on admission)   Assessment & Plan    Secondary to above. Panculture. Follow up with culture results. On IV antibiotics.        Patient is critically ill.   The patient continues to have: Worsening respiratory status and low blood pressure.  The vital organ system that is affected is the: Respiratory system, kidneys, liver, cardiovascular.  If untreated there is a high chance of deterioration into: Shock and cardiopulmonary arrest  And eventually death.   The critical care that I am providing today is: Started pressors, admit the patient to intensive care unit, discuss poor prognosis with family members.  The critical that has been undertaken is medically complex.   There has been no overlap in critical care time.   Critical Care Time not including procedures: 35 minutes    VTE prophylaxis: SCDs

## 2018-06-24 LAB
BACTERIA BLD CULT: ABNORMAL
SIGNIFICANT IND 70042: ABNORMAL
SIGNIFICANT IND 70042: ABNORMAL
SITE SITE: ABNORMAL
SITE SITE: ABNORMAL
SOURCE SOURCE: ABNORMAL
SOURCE SOURCE: ABNORMAL

## 2019-06-13 NOTE — PROGRESS NOTES
Received report from day shift RN, assumed care of patient at 1900. AOx4. Upself. Denies nausea. Complaining of 10/10 pain, medicated per MAR. PIV w/ fluids infusing. Liver biopsy site with dressing in place, CDI. Wife at bedside.  Call light within reach, bed in low and locked position. No other needs at this time.    Yes - the patient is able to be screened

## 2023-12-02 NOTE — ED NOTES
Pt family leaving pt bedside at this time.  They have had the opportunity to ask questions and have answers and say their goodbyes to the pt.  Jolene,  notified, she has provided packet for pt family.   TBD

## (undated) DEVICE — BASIN EMESIS DISP. - (250/CA)

## (undated) DEVICE — FILM CASSETTE ENDO

## (undated) DEVICE — KIT PROCEDURE DOUBLE ENDO ONLY (5/CA)

## (undated) DEVICE — CON SEDATION EA ADDL 15 MIN

## (undated) DEVICE — CONTAINER, SPECIMEN, STERILE

## (undated) DEVICE — CANISTER SUCTION RIGID RED 1500CC (40EA/CA)

## (undated) DEVICE — SPONGE GAUZE NON-STERILE 4X4 - (2000/CA 10PK/CA)

## (undated) DEVICE — CATHERTER CLEAR SINGLE USE INJECTION THERAPY NEEDLE 25GA X 4MM  2.3MM X 240CM (5EA/BX)

## (undated) DEVICE — MASK WITH FACE SHIELD (25/BX 4BX/CA)

## (undated) DEVICE — SYRINGE 3 CC 22 GA X 1-1/2 - NDL SAFETY (50/BX 8BX/CA)

## (undated) DEVICE — TUBE CONNECTING SUCTION - CLEAR PLASTIC STERILE 72 IN (50EA/CA)

## (undated) DEVICE — SNARECAPTIVATOR II - 20MM ROUND STIFF (40/BX)

## (undated) DEVICE — SYRINGE 6 CC 20 GA X 1 1/2 - NDL SAFETY  (50/BX)

## (undated) DEVICE — TATTOO ENDOSCOPIC SPOT EX (10EA/BX)

## (undated) DEVICE — CON SEDATION/>5 YR 1ST 15 MIN

## (undated) DEVICE — SOD. CHL 10CC SYRINGE PREFILL - W/10 CC (30/BX)

## (undated) DEVICE — CANNULA W/ SUPPLY TUBING O2 - (50/CA)

## (undated) DEVICE — SODIUM CHL. INJ. 0.9% 500ML (24EA/CA 50CA/PF)

## (undated) DEVICE — ELECTRODE DUAL RETURN W/ CORD - (50/PK)

## (undated) DEVICE — ELECTRODE 850 FOAM ADHESIVE - HYDROGEL RADIOTRNSPRNT (50/PK)

## (undated) DEVICE — SYRINGE DISP. 50CC LS - (40/BX)

## (undated) DEVICE — GOWN SURGEONS X-LARGE - DISP. (30/CA)

## (undated) DEVICE — TUBING CLEARLINK DUO-VENT - C-FLO (48EA/CA)

## (undated) DEVICE — BITE BLOCK ADULT 60FR (100EA/CA)